# Patient Record
Sex: MALE | Race: WHITE | Employment: FULL TIME | URBAN - METROPOLITAN AREA
[De-identification: names, ages, dates, MRNs, and addresses within clinical notes are randomized per-mention and may not be internally consistent; named-entity substitution may affect disease eponyms.]

---

## 2017-01-01 ENCOUNTER — OFFICE VISIT (OUTPATIENT)
Dept: URGENT CARE | Facility: URGENT CARE | Age: 27
End: 2017-01-01
Payer: COMMERCIAL

## 2017-01-01 VITALS
HEART RATE: 81 BPM | WEIGHT: 161 LBS | SYSTOLIC BLOOD PRESSURE: 127 MMHG | TEMPERATURE: 97.8 F | BODY MASS INDEX: 27.86 KG/M2 | OXYGEN SATURATION: 97 % | DIASTOLIC BLOOD PRESSURE: 74 MMHG

## 2017-01-01 DIAGNOSIS — S61.452A CAT BITE OF HAND, LEFT, INITIAL ENCOUNTER: Primary | ICD-10-CM
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
## 2017-01-01 DIAGNOSIS — L03.114 CELLULITIS OF LEFT UPPER EXTREMITY: ICD-10-CM

## 2017-01-01 PROCEDURE — 99213 OFFICE O/P EST LOW 20 MIN: CPT | Performed by: PHYSICIAN ASSISTANT

## 2017-01-01 NOTE — MR AVS SNAPSHOT
"              After Visit Summary   2017    Matt Quintanilla    MRN: [id number]           Patient Information     Date Of Birth          1990        Visit Information        Provider Department      2017 11:25 AM Anastasia Lassiter PA-C [facility]        Today's Diagnoses     Cat bite of hand, left, initial encounter    -  1     Cellulitis of left upper extremity            Follow-ups after your visit        Who to contact     If you have questions or need follow up information about today's clinic visit or your schedule please contact [facility] directly at [phone].  Normal or non-critical lab and imaging results will be communicated to you by [email or web address], letter or phone within 4 business days after the clinic has received the results. If you do not hear from us within 7 days, please contact the clinic through [email or web address] or phone. If you have a critical or abnormal lab result, we will notify you by phone as soon as possible.  Submit refill requests through Rico or call your pharmacy and they will forward the refill request to us. Please allow 3 business days for your refill to be completed.          Additional Information About Your Visit        MyChart Information     Rico lets you send messages to your doctor, view your test results, renew your prescriptions, schedule appointments and more. To sign up, go to [email or web address] . Click on \"Log in\" on the left side of the screen, which will take you to the Welcome page. Then click on \"Sign up Now\" on the right side of the page.     You will be asked to enter the access code listed below, as well as some personal information. Please follow the directions to create your username and password.     Your access code is: [email or web address]  Expires: 2017 12:03 PM     Your access code will  in 90 days. If you need help or a new code, please call your [facility] or [phone].        Your Vitals Were "     Pulse Temperature Pulse Oximetry             81 97.8  F (36.6  C) (Oral) 97%          Blood Pressure from Last 3 Encounters:   01/01/17 127/74   09/09/16 136/80   07/13/15 137/80    Weight from Last 3 Encounters:   01/01/17 161 lb (73.029 kg)   09/09/16 153 lb (69.4 kg)   07/13/15 175 lb (79.379 kg)              Today, you had the following     No orders found for display         Today's Medication Changes          These changes are accurate as of: 1/1/17 12:03 PM.  If you have any questions, ask your nurse or doctor.               Start taking these medicines.        Dose/Directions    amoxicillin-clavulanate 875-125 MG per tablet   Commonly known as:  AUGMENTIN   Used for:  Cat bite of hand, left, initial encounter, Cellulitis of left upper extremity   Started by:  Anastasia Lassiter PA-C        Dose:  1 tablet   Take 1 tablet by mouth 2 times daily   Quantity:  20 tablet   Refills:  0            Where to get your medicines      These medications were sent to [facility]/pharmacy #[id number] - [place] [facility] [place], [place] - [address]  [address] [address]     Phone:  [phone]    - amoxicillin-clavulanate 875-125 MG per tablet             Primary Care Provider Office Phone # Fax #    Argelia Naranjo [phone]       [facility] [address]  [facility] [address]        Thank you!     Thank you for choosing [facility]  for your care. Our goal is always to provide you with excellent care. Hearing back from our patients is one way we can continue to improve our services. Please take a few minutes to complete the written survey that you may receive in the mail after your visit with us. Thank you!             Your Updated Medication List - Protect others around you: Learn how to safely use, store and throw away your medicines at www.disposemymeds.org.          This list is accurate as of: 1/1/17 12:03 PM.  Always use your most recent med list.                    Brand Name Dispense Instructions for use    amoxicillin-clavulanate 875-125 MG per tablet    AUGMENTIN    20 tablet    Take 1 tablet by mouth 2 times daily       omeprazole 40 MG capsule    priLOSEC    30 capsule    Take 1 capsule (40 mg) by mouth daily Take 30-60 minutes before a meal.

## 2017-01-01 NOTE — NURSING NOTE
"Chief Complaint   Patient presents with     Trauma     Pt c/o cat bite on left hand with swelling, redness, and warmth.        Initial /74 mmHg  Pulse 81  Temp(Src) 97.8  F (36.6  C) (Oral)  Wt 161 lb (73.029 kg)  SpO2 97% Estimated body mass index is 27.86 kg/(m^2) as calculated from the following:    Height as of 9/9/16: 5' 3.75\" (1.619 m).    Weight as of this encounter: 161 lb (73.029 kg).  BP completed using cuff size: regular    Kristin Gill CMA (AAMA)      "

## 2017-01-01 NOTE — PROGRESS NOTES
SUBJECTIVE:                                                    Matt Quintanilla is a 26 year old male who presents to clinic today for the following health issues:    Cat Bite      Duration: yesterday evening    Description (location/character/radiation): left hand    Intensity:  moderate    Accompanying signs and symptoms: swelling, redness, warm to the touch    History (similar episodes/previous evaluation): None    Precipitating or alleviating factors: Pt states that the cat is up to date on their vaccines.     Therapies tried and outcome: None   Friend's cat bit him last night. Cat is current on rabies vaccinations. Tdap 2011 per [facility]        Allergies   Allergen Reactions     No Known Drug Allergies        No past medical history on file.      Current Outpatient Prescriptions on File Prior to Visit:  omeprazole (PRILOSEC) 40 MG capsule Take 1 capsule (40 mg) by mouth daily Take 30-60 minutes before a meal.     No current facility-administered medications on file prior to visit.    Social History   Substance Use Topics     Smoking status: Current Every Day Smoker -- 0.50 packs/day for 1 years     Types: Cigarettes     Smokeless tobacco: Never Used     Alcohol Use: Yes      Comment: socially       ROS:  General: negative for fever  SKIN: + as above      Physcial Exam:  /74 mmHg  Pulse 81  Temp(Src) 97.8  F (36.6  C) (Oral)  Wt 161 lb (73.029 kg)  SpO2 97%    GENERAL: alert, no acute distress  EYES: conjunctival clear  RESP: Regular breathing rate  NEURO: awake .  SKIN: Left dorsal hand red, tender, mild swelling. 3 puncture wounds base left middle finger. Circ./sensation intact. FROM of wrist/fingers    ASSESSMENT:    ICD-10-CM    1. Cat bite of hand, left, initial encounter S61.452A amoxicillin-clavulanate (AUGMENTIN) 875-125 MG per tablet    W55.01XA    2. Cellulitis of left upper extremity L03.114 amoxicillin-clavulanate (AUGMENTIN) 875-125 MG per tablet       PLAN:Warm soaks in [place] daily. Recheck  3 days if not better, sooner if worsens.  See today's orders.  Follow-up with primary clinic if not improving.  Advised about symptoms which might herald more serious problems.    Anastasia Lassiter PA-C

## 2017-01-11 ENCOUNTER — OFFICE VISIT (OUTPATIENT)
Dept: FAMILY MEDICINE | Facility: CLINIC | Age: 27
End: 2017-01-11
Payer: COMMERCIAL

## 2017-01-11 VITALS
BODY MASS INDEX: 27.45 KG/M2 | DIASTOLIC BLOOD PRESSURE: 74 MMHG | OXYGEN SATURATION: 95 % | TEMPERATURE: 98.3 F | SYSTOLIC BLOOD PRESSURE: 130 MMHG | HEART RATE: 79 BPM | HEIGHT: 64 IN | WEIGHT: 160.8 LBS

## 2017-01-11 DIAGNOSIS — A63.0 CONDYLOMA ACUMINATA: Primary | ICD-10-CM

## 2017-01-11 PROCEDURE — 54056 CRYOSURGERY PENIS LESION(S): CPT | Performed by: FAMILY MEDICINE

## 2017-01-11 PROCEDURE — 99207 ZZC NO CHARGE LOS: CPT | Performed by: FAMILY MEDICINE

## 2017-01-11 RX ORDER — IMIQUIMOD 12.5 MG/.25G
CREAM TOPICAL
Qty: 12 PACKET | Refills: 3 | Status: SHIPPED | OUTPATIENT
Start: 2017-01-11 | End: 2021-08-10

## 2017-01-11 NOTE — NURSING NOTE
"Chief Complaint   Patient presents with     Wart     groin and penile warts       Initial /74 mmHg  Pulse 79  Temp(Src) 98.3  F (36.8  C) (Oral)  Ht 5' 4\" (1.626 m)  Wt 160 lb 12.8 oz (72.938 kg)  BMI 27.59 kg/m2  SpO2 95% Estimated body mass index is 27.59 kg/(m^2) as calculated from the following:    Height as of this encounter: 5' 4\" (1.626 m).    Weight as of this encounter: 160 lb 12.8 oz (72.938 kg).  BP completed using cuff size: dale Venegas CMA  January 11, 2017 10:48 AM        "

## 2017-01-11 NOTE — PROGRESS NOTES
"  SUBJECTIVE:                                                    Matt Quintanilla is a 26 year old male who presents to clinic today for the following health issues:      WART(S)      Onset: a few years ago    Description (location/number): groin and penis---about 5    Accompanying signs and symptoms: Painful: no     History: prior warts: no     Therapies tried and outcome: Compound W       Problem list and histories reviewed & adjusted, as indicated.  Additional history: as documented    Problem list, Medication list, Allergies, and Medical/Social/Surgical histories reviewed in [facility] and updated as appropriate.    ROS:  Constitutional, HEENT, cardiovascular, pulmonary, GI, , musculoskeletal, neuro, skin, endocrine and psych systems are negative, except as otherwise noted.    OBJECTIVE:                                                    /74 mmHg  Pulse 79  Temp(Src) 98.3  F (36.8  C) (Oral)  Ht 5' 4\" (1.626 m)  Wt 160 lb 12.8 oz (72.938 kg)  BMI 27.59 kg/m2  SpO2 95%  Body mass index is 27.59 kg/(m^2).  GENERAL: healthy, alert and no distress  NECK: no adenopathy, no asymmetry, masses, or scars and thyroid normal to palpation  RESP: lungs clear to auscultation - no rales, rhonchi or wheezes  CV: regular rate and rhythm, normal S1 S2, no S3 or S4, no murmur, click or rub, no peripheral edema and peripheral pulses strong  ABDOMEN: soft, nontender, no hepatosplenomegaly, no masses and bowel sounds normal  MS: no gross musculoskeletal defects noted, no edema  : about 7 warty lesions measuring 0.5 to 1 cm at base and shaft of penis  Diagnostic Test Results:  none      ASSESSMENT/PLAN:                                                      1. Condyloma acuminata  Liquid nitrogen used today to treat 7 warts with 3 freeze and thaw cycles. Patient given Aldara to try for 16 weeks. Patient will see dermatology if no improvements.  - imiquimod (ALDARA) 5 % cream; Apply a small sized amount to warts or molluscum " three times weekly at bedtime.   Wash off after 8 hours.   May use for up to 16 weeks.  Dispense: 12 packet; Refill: 3  - DERMATOLOGY REFERRAL  - DESTRUCT BENIGN LESION, UP TO 14    See Patient Instructions    Rubén Almanzar MD, MD  [facility]

## 2017-01-11 NOTE — MR AVS SNAPSHOT
After Visit Summary   1/11/2017    Matt Quintanilla    MRN: [id number]           Patient Information     Date Of Birth          1990        Visit Information        Provider Department      1/11/2017 10:40 AM Rubén Almanzar MD [facility]        Today's Diagnoses     Condyloma acuminata    -  1        Follow-ups after your visit        Additional Services     DERMATOLOGY REFERRAL       Your provider has referred you to: N: [facility] with Dr. Owen Mauro [phone]   [email or web address]    Please be aware that coverage of these services is subject to the terms and limitations of your health insurance plan.  Call member services at your health plan with any benefit or coverage questions.      Please bring the following with you to your appointment:    (1) Any X-Rays, CTs or MRIs which have been performed.  Contact the facility where they were done to arrange for  prior to your scheduled appointment.    (2) List of current medications  (3) This referral request   (4) Any documents/labs given to you for this referral                  Who to contact     If you have questions or need follow up information about today's clinic visit or your schedule please contact [facility] directly at [phone].  Normal or non-critical lab and imaging results will be communicated to you by MyChart, letter or phone within 4 business days after the clinic has received the results. If you do not hear from us within 7 days, please contact the clinic through MyChart or phone. If you have a critical or abnormal lab result, we will notify you by phone as soon as possible.  Submit refill requests through Yodo1 or call your pharmacy and they will forward the refill request to us. Please allow 3 business days for your refill to be completed.          Additional Information About Your Visit        Yopolishart Information     Yodo1 lets you send  "messages to your doctor, view your test results, renew your prescriptions, schedule appointments and more. To sign up, go to [email or web address] . Click on \"Log in\" on the left side of the screen, which will take you to the Welcome page. Then click on \"Sign up Now\" on the right side of the page.     You will be asked to enter the access code listed below, as well as some personal information. Please follow the directions to create your username and password.     Your access code is: [email or web address]  Expires: 2017 12:03 PM     Your access code will  in 90 days. If you need help or a new code, please call your [facility] clinic or [phone].        Care EveryWhere ID     This is your Care EveryWhere ID. This could be used by other organizations to access your [facility] medical records  [id number]        Your Vitals Were     Pulse Temperature Height BMI (Body Mass Index) Pulse Oximetry       79 98.3  F (36.8  C) (Oral) 5' 4\" (1.626 m) 27.59 kg/m2 95%        Blood Pressure from Last 3 Encounters:   17 130/74   17 127/74   16 136/80    Weight from Last 3 Encounters:   17 160 lb 12.8 oz (72.938 kg)   17 161 lb (73.029 kg)   16 153 lb (69.4 kg)              We Performed the Following     DERMATOLOGY REFERRAL          Today's Medication Changes          These changes are accurate as of: 17 11:00 AM.  If you have any questions, ask your nurse or doctor.               Start taking these medicines.        Dose/Directions    imiquimod 5 % cream   Commonly known as:  ALDARA   Used for:  Condyloma acuminata   Started by:  Rubén Almanzar MD        Apply a small sized amount to warts or molluscum three times weekly at bedtime.   Wash off after 8 hours.   May use for up to 16 weeks.   Quantity:  12 packet   Refills:  3            Where to get your medicines      These medications were sent to [facility]/pharmacy #[id number] - [place] - [address]  [address] [facility] [address]     Phone:  [phone]    - imiquimod 5 % cream             Primary Care Provider Office Phone # Fax #    Argelia Cindy Naranjo [phone]       [facility] [address]  [facility] [address]        Thank you!     Thank you for choosing [facility]  for your care. Our goal is always to provide you with excellent care. Hearing back from our patients is one way we can continue to improve our services. Please take a few minutes to complete the written survey that you may receive in the mail after your visit with us. Thank you!             Your Updated Medication List - Protect others around you: Learn how to safely use, store and throw away your medicines at www.disposemymeds.org.          This list is accurate as of: 1/11/17 11:00 AM.  Always use your most recent med list.                   Brand Name Dispense Instructions for use    amoxicillin-clavulanate 875-125 MG per tablet    AUGMENTIN    20 tablet    Take 1 tablet by mouth 2 times daily       imiquimod 5 % cream    ALDARA    12 packet    Apply a small sized amount to warts or molluscum three times weekly at bedtime.   Wash off after 8 hours.   May use for up to 16 weeks.       omeprazole 40 MG capsule    priLOSEC    30 capsule    Take 1 capsule (40 mg) by mouth daily Take 30-60 minutes before a meal.

## 2017-04-17 ENCOUNTER — TELEPHONE (OUTPATIENT)
Dept: URGENT CARE | Facility: URGENT CARE | Age: 27
End: 2017-04-17

## 2017-04-17 ENCOUNTER — RADIANT APPOINTMENT (OUTPATIENT)
Dept: GENERAL RADIOLOGY | Facility: CLINIC | Age: 27
End: 2017-04-17
Attending: PHYSICIAN ASSISTANT
Payer: COMMERCIAL

## 2017-04-17 ENCOUNTER — OFFICE VISIT (OUTPATIENT)
Dept: URGENT CARE | Facility: URGENT CARE | Age: 27
End: 2017-04-17
Payer: COMMERCIAL

## 2017-04-17 VITALS
HEART RATE: 74 BPM | TEMPERATURE: 97.3 F | WEIGHT: 170.2 LBS | DIASTOLIC BLOOD PRESSURE: 80 MMHG | OXYGEN SATURATION: 96 % | SYSTOLIC BLOOD PRESSURE: 135 MMHG | BODY MASS INDEX: 29.21 KG/M2

## 2017-04-17 DIAGNOSIS — M77.8 TENDINITIS OF RIGHT WRIST: ICD-10-CM

## 2017-04-17 DIAGNOSIS — M25.531 RIGHT WRIST PAIN: ICD-10-CM

## 2017-04-17 DIAGNOSIS — M25.531 RIGHT WRIST PAIN: Primary | ICD-10-CM

## 2017-04-17 PROCEDURE — 99213 OFFICE O/P EST LOW 20 MIN: CPT | Performed by: PHYSICIAN ASSISTANT

## 2017-04-17 PROCEDURE — 73110 X-RAY EXAM OF WRIST: CPT | Mod: RT

## 2017-04-17 RX ORDER — NAPROXEN 500 MG/1
500 TABLET ORAL 2 TIMES DAILY PRN
Qty: 30 TABLET | Refills: 1 | Status: SHIPPED | OUTPATIENT
Start: 2017-04-17 | End: 2021-08-10

## 2017-04-17 NOTE — NURSING NOTE
"Chief Complaint   Patient presents with     Musculoskeletal Problem     started on Friday on the right wrist       Initial /80  Pulse 74  Temp 97.3  F (36.3  C) (Oral)  Wt 170 lb 3.2 oz (77.2 kg)  SpO2 96%  BMI 29.21 kg/m2 Estimated body mass index is 29.21 kg/(m^2) as calculated from the following:    Height as of 1/11/17: 5' 4\" (1.626 m).    Weight as of this encounter: 170 lb 3.2 oz (77.2 kg).  Medication Reconciliation: complete   Bill Molina MA        "

## 2017-04-17 NOTE — PROGRESS NOTES
SUBJECTIVE:                                                    Matt Quintanilla is a 26 year old male who presents to clinic today for the following health issues:      Musculoskeletal problem/pain      Duration: Friday    Description  Location: Right wrist    Intensity:  8/10    Accompanying signs and symptoms: none    History  Previous similar problem: no   Previous evaluation:  none    Precipitating or alleviating factors:  Trauma or overuse: no   Aggravating factors include: using the hand    Therapies tried and outcome: support wrap        No injury. Works at [employer] in [employer]. No numbness/tingling.      Allergies   Allergen Reactions     No Known Drug Allergies        No past medical history on file.      Current Outpatient Prescriptions on File Prior to Visit:  imiquimod (ALDARA) 5 % cream Apply a small sized amount to warts or molluscum three times weekly at bedtime.   Wash off after 8 hours.   May use for up to 16 weeks.   omeprazole (PRILOSEC) 40 MG capsule Take 1 capsule (40 mg) by mouth daily Take 30-60 minutes before a meal. (Patient not taking: Reported on 4/17/2017)     No current facility-administered medications on file prior to visit.     Social History   Substance Use Topics     Smoking status: Current Some Day Smoker     Packs/day: 0.50     Years: 1.00     Types: Cigars     Smokeless tobacco: Never Used     Alcohol use Yes      Comment: socially       ROS:  Gen: np fevers  Musculoskel: + as above  Skin: as above    OBJECTIVE:  /80  Pulse 74  Temp 97.3  F (36.3  C) (Oral)  Wt 170 lb 3.2 oz (77.2 kg)  SpO2 96%  BMI 29.21 kg/m2   General:   awake, alert, and cooperative.  NAD.   Head: Normocephalic, atraumatic.  Eyes: Conjunctiva clear,   MS:  R wrist, no swelling.  Tender mid dorsal wrist. Pain with resisted flexion/extension. No snuff box tenderness. FROM. Circ./sensation intact. Good radial pulse.  Neuro: Alert and oriented - normal speech.    ASSESSMENT:    ICD-10-CM    1. Right  wrist pain M25.531 naproxen (NAPROSYN) 500 MG tablet     ORTHOPEDICS ADULT REFERRAL     CANCELED: XR Wrist Right G/E 3 Views   2. Tendinitis of right wrist M77.8            PLAN: Ice, wrist splint.   Advised about symptoms which might herald more serious problems.      Anastasia Lassiter PA-C

## 2017-04-17 NOTE — TELEPHONE ENCOUNTER
Bill Molina contacted Matt on 04/17/17 and left a message. If patient calls back please relay message (normal labs) below. Bill Molina MA    The Radiologist felt your x rays were normal.     Please notify patient of results.     Anastasia Lassiter PA-C

## 2017-04-17 NOTE — MR AVS SNAPSHOT
After Visit Summary   4/17/2017    Matt Quintanilla    MRN: [id number]           Patient Information     Date Of Birth          1990        Visit Information        Provider Department      4/17/2017 12:10 PM Anastasia Lassiter PA-C [facility]        Today's Diagnoses     Right wrist pain    -  1    Tendinitis of right wrist           Follow-ups after your visit        Additional Services     ORTHOPEDICS ADULT REFERRAL       Your provider has referred you to: [facility]: [facility] [phone]   [email or web address]    Please be aware that coverage of these services is subject to the terms and limitations of your health insurance plan.  Call member services at your health plan with any benefit or coverage questions.      Please bring the following to your appointment:    >>   Any x-rays, CTs or MRIs which have been performed.  Contact the facility where they were done to arrange for  prior to your scheduled appointment.  Any new CT, MRI or other procedures ordered by your specialist must be performed at a [facility] facility or coordinated by your clinic's referral office.    >>   List of current medications   >>   This referral request   >>   Any documents/labs given to you for this referral                  Who to contact     If you have questions or need follow up information about today's clinic visit or your schedule please contact [facility] directly at [phone].  Normal or non-critical lab and imaging results will be communicated to you by MyChart, letter or phone within 4 business days after the clinic has received the results. If you do not hear from us within 7 days, please contact the clinic through MyChart or phone. If you have a critical or abnormal lab result, we will notify you by phone as soon as possible.  Submit refill requests through BringMeTheNewst or call your pharmacy and they  "will forward the refill request to us. Please allow 3 business days for your refill to be completed.          Additional Information About Your Visit        InHomeVestharFinancial Fairy Tales Information     H-care lets you send messages to your doctor, view your test results, renew your prescriptions, schedule appointments and more. To sign up, go to [email or web address] . Click on \"Log in\" on the left side of the screen, which will take you to the Welcome page. Then click on \"Sign up Now\" on the right side of the page.     You will be asked to enter the access code listed below, as well as some personal information. Please follow the directions to create your username and password.     Your access code is: [email or web address]  Expires: 2017  3:03 PM     Your access code will  in 90 days. If you need help or a new code, please call your [facility] clinic or [phone].        Care EveryWhere ID     This is your Care EveryWhere ID. This could be used by other organizations to access your [facility] medical records  [id number]        Your Vitals Were     Pulse Temperature Pulse Oximetry BMI (Body Mass Index)          74 97.3  F (36.3  C) (Oral) 96% 29.21 kg/m2         Blood Pressure from Last 3 Encounters:   17 135/80   17 130/74   17 127/74    Weight from Last 3 Encounters:   17 170 lb 3.2 oz (77.2 kg)   17 160 lb 12.8 oz (72.9 kg)   17 161 lb (73 kg)              We Performed the Following     ORTHOPEDICS ADULT REFERRAL          Today's Medication Changes          These changes are accurate as of: 17  3:03 PM.  If you have any questions, ask your nurse or doctor.               Start taking these medicines.        Dose/Directions    naproxen 500 MG tablet   Commonly known as:  NAPROSYN   Used for:  Right wrist pain   Started by:  Anastasia Lassiter PA-C        Dose:  500 mg   Take 1 tablet (500 mg) by mouth 2 times daily as needed for moderate pain   Quantity:  30 tablet   Refills:  1          "   Where to get your medicines      These medications were sent to [facility]/pharmacy #[id number] - [facility] [facility] [place], [place] - [address]  [address] [facility] [address]     Phone:  [phone]     naproxen 500 MG tablet                Primary Care Provider Office Phone # Fax #    Argelia Cindy Naranjo [phone]       [facility] [address]  [facility] [address]        Thank you!     Thank you for choosing [facility]  for your care. Our goal is always to provide you with excellent care. Hearing back from our patients is one way we can continue to improve our services. Please take a few minutes to complete the written survey that you may receive in the mail after your visit with us. Thank you!             Your Updated Medication List - Protect others around you: Learn how to safely use, store and throw away your medicines at www.disposemymeds.org.          This list is accurate as of: 4/17/17  3:03 PM.  Always use your most recent med list.                   Brand Name Dispense Instructions for use    imiquimod 5 % cream    ALDARA    12 packet    Apply a small sized amount to warts or molluscum three times weekly at bedtime.   Wash off after 8 hours.   May use for up to 16 weeks.       naproxen 500 MG tablet    NAPROSYN    30 tablet    Take 1 tablet (500 mg) by mouth 2 times daily as needed for moderate pain       omeprazole 40 MG capsule    priLOSEC    30 capsule    Take 1 capsule (40 mg) by mouth daily Take 30-60 minutes before a meal.

## 2017-04-17 NOTE — LETTER
[facility]  [address]  [facility] [address]  Phone: [phone]    April 17, 2017        Matt Quintanilla  [address] [facility] [address]          To whom it may concern:    RE: Matt Quintanilla    Patient was seen and treated today at our clinic for right wrist pain. Please excuse from work 4/17/2017    Please contact me for questions or concerns.      Sincerely,        Anastasia Lassiter PA-C

## 2017-04-19 ENCOUNTER — TELEPHONE (OUTPATIENT)
Dept: FAMILY MEDICINE | Facility: CLINIC | Age: 27
End: 2017-04-19

## 2017-04-19 NOTE — TELEPHONE ENCOUNTER
What type of form? ADA DISABILITY FORM   What day did you drop off your forms? Wednesday April 19th 2017  Is there a due date? ASAP  (7-10 business day to compete forms)   How would you like to receive these forms? Pt will  form   Provider pt. See's ? Dr. Argelia Naranjo   What is the best number to contact you? Pt . Can be reached at ( [phone]  What time works best to contact you with in 4 hrs?  Anytime   Is it okay to leave a message? Yes     FORM IN TEAM HEART BOX    Antwon Guadarrama, Patient Rep  [facility]

## 2017-04-20 NOTE — TELEPHONE ENCOUNTER
Form is received from the  and forward to Dr. Naranjo to address.  Audie Plascencia,  For [employer] Comfort and Heart

## 2017-04-24 NOTE — TELEPHONE ENCOUNTER
I don't have any information in the chart that indicates a disability. Patient needs to make an appointment. This form does not apply to temporary problems like tendonitis unless the issue is persistent over time. This needs further assessment.  Argelia Naranjo MD

## 2017-04-24 NOTE — TELEPHONE ENCOUNTER
Pt's incomplete form will be deliver to the  this afternoon for pickup after 1p.  Audie Plascencia,  [employer]    Called and left msg for pt the form is not completed, pt needs to schedule a follow up appt with Dr. Naranjo or a provider and bring the form to the appt to have provider fill out for pt, per Dr. Naranjo pt needs further assessment. Pt's form will be at the  for pt to pickup today after 1p.  Audie Plascencia,  For [employer]

## 2017-04-25 ENCOUNTER — OFFICE VISIT (OUTPATIENT)
Dept: ORTHOPEDICS | Facility: CLINIC | Age: 27
End: 2017-04-25
Payer: COMMERCIAL

## 2017-04-25 VITALS — RESPIRATION RATE: 15 BRPM | HEIGHT: 65 IN | BODY MASS INDEX: 29.32 KG/M2 | WEIGHT: 176 LBS

## 2017-04-25 DIAGNOSIS — M25.531 RIGHT WRIST PAIN: Primary | ICD-10-CM

## 2017-04-25 PROCEDURE — 99203 OFFICE O/P NEW LOW 30 MIN: CPT | Performed by: ORTHOPAEDIC SURGERY

## 2017-04-25 NOTE — PROGRESS NOTES
Matt Quintanilla is a 26 year old male who is seen in consultation at the request of Anastasia Lassiter PA-C  for right wrist pain.    History reviewed. No pertinent past medical history.    History reviewed. No pertinent surgical history.    Family History   Problem Relation Age of Onset     Hypertension Mother      Lipids Mother      CEREBROVASCULAR DISEASE Mother      DIABETES Father      DIABETES Maternal Grandfather      DIABETES Paternal Grandmother      DIABETES Paternal Grandfather        Social History     Social History     Marital status: Single     Spouse name: N/A     Number of children: N/A     Years of education: N/A     Occupational History     Not on file.     Social History Main Topics     Smoking status: Current Some Day Smoker     Packs/day: 0.50     Years: 1.00     Types: Cigars     Smokeless tobacco: Never Used     Alcohol use Yes      Comment: socially     Drug use: No     Sexual activity: Not Currently     Partners: Female     Other Topics Concern     Parent/Sibling W/ Cabg, Mi Or Angioplasty Before 65f 55m? No     Social History Narrative       Current Outpatient Prescriptions   Medication Sig Dispense Refill     naproxen (NAPROSYN) 500 MG tablet Take 1 tablet (500 mg) by mouth 2 times daily as needed for moderate pain 30 tablet 1     imiquimod (ALDARA) 5 % cream Apply a small sized amount to warts or molluscum three times weekly at bedtime.   Wash off after 8 hours.   May use for up to 16 weeks. 12 packet 3     omeprazole (PRILOSEC) 40 MG capsule Take 1 capsule (40 mg) by mouth daily Take 30-60 minutes before a meal. (Patient not taking: Reported on 4/17/2017) 30 capsule 1       Allergies   Allergen Reactions     No Known Drug Allergies        REVIEW OF SYSTEMS:  CONSTITUTIONAL:  NEGATIVE for fever, chills, change in weight, not feeling tired  SKIN:  NEGATIVE for worrisome rashes, no skin lumps, no skin ulcers and no non-healing wounds  EYES:  NEGATIVE for vision changes or  "irritation.  ENT/MOUTH:  NEGATIVE.  No hearing loss, no hoarseness, no difficulty swallowing.  RESP:  NEGATIVE. No cough or shortness of breath.  BREAST:  NEGATIVE for masses, tenderness or discharge  CV:  NEGATIVE for chest pain, palpitations or peripheral edema  GI:  NEGATIVE for nausea, abdominal pain, heartburn, or change in bowel habits  :  Negative. No dysuria, no hematuria  MUSCULOSKELETAL:  See HPI above  NEURO:  NEGATIVE . No headaches, no dizziness,  no numbness  ENDOCRINE:  NEGATIVE for temperature intolerance, skin/hair changes  HEME/ALLERGY/IMMUNE:  NEGATIVE for bleeding problems  PSYCHIATRIC:  NEGATIVE. no anxiety, no depression.     Exam:  Vitals: Resp 15  Ht 1.638 m (5' 4.5\")  Wt 79.8 kg (176 lb)  BMI 29.74 kg/m2  BMI= Body mass index is 29.74 kg/(m^2).  Constitutional:  healthy, alert and no distress  Neuro: Alert and Oriented x 3, Gait normal. Sensation grossly WNL.  Psych: Affect normal   Respiratory: Breathing not labored.  Cardiovascular: normal peripheral pulses  Lymph: no adenopathy  Skin: No rashes,worrisome lesions or skin problems    "

## 2017-04-25 NOTE — NURSING NOTE
"Chief Complaint   Patient presents with     Consult     Referred by Anastasia Lassiter PA-C for right wrist pain, denies injury or trauma. Xrays taken 4/17/17.       Initial Resp 15  Ht 1.638 m (5' 4.5\")  Wt 79.8 kg (176 lb)  BMI 29.74 kg/m2 Estimated body mass index is 29.74 kg/(m^2) as calculated from the following:    Height as of this encounter: 1.638 m (5' 4.5\").    Weight as of this encounter: 79.8 kg (176 lb).  Medication Reconciliation: complete     Jay Moss PA-C  Supervising physician: Redd Aceves MD  Dept. of Orthopedics  [facility]          "

## 2017-04-25 NOTE — PROGRESS NOTES
DATE OF VISIT:  04/25/2017.      HISTORY OF PRESENT ILLNESS:  Mr. Bill Quintanilla is a 26-year-old right-hand dominant  seen with right wrist pain.  It has been present since 04/17/2017.  He does not recall a specific injury.  He wakes up in the morning with pain, gets a little better after he uses it, but has persistent pain throughout the days.  He seems to hurt with movement.  He has used a wrist brace with some mild relief.  He has dull pain rated 7/10 in the right wrist.  He has used naproxen 500 mg b.i.d.  He used it twice a day for the first 3-4 days, but then cut back on it.  He has had previous problems with the left wrist with x-rays performed in 2009.  Those are not available for us at this time.  X-ray of the right wrist has been performed 04/17/2017, showing an ulnar minus wrist with no definite evidence of lunate sclerosis or other pathology.      PHYSICAL EXAMINATION:  Diffuse pain in the right wrist.  He has pain with flexion, extension, mainly with extension dorsally.  He does have some pain with radial and ulnar deviation as well. Has no significant pain with pronation, supination.  He has no tenderness in the snuffbox but does have some tenderness in the mid dorsal aspect of the wrist.  He has good  strength and thumb opposition strength.  Has negative Tinel over the ulnar nerve at the wrist and elbow.  Has mildly positive Tinel over the median nerve at the right wrist but has negative Phalen's test.  Sensation and circulation are intact.      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  We discussed options of continuing the wrist brace and observation.  Would also consider referral to Hand Surgery to see if anything would be recommended for the ulnar minus variance.  He wishes to talk to Hand Surgery so we will refer to Dr. Johana Harrison for this.         LESLIE BUCKNER MD             D: 04/25/2017 12:47   T: 04/25/2017 17:07   MT: SAVI      Name:     BILL QUINTANILLA    MRN:      [id number]        Account:      [id number]   :      1990           Visit Date:   2017      Document: [id number]

## 2017-04-25 NOTE — MR AVS SNAPSHOT
After Visit Summary   4/25/2017    Matt Quintanilla    MRN: [id number]           Patient Information     Date Of Birth          1990        Visit Information        Provider Department      4/25/2017 11:00 AM Redd Aceves MD [facility]        Today's Diagnoses     Right wrist pain    -  1       Follow-ups after your visit        Additional Services     ORTHO  REFERRAL       [facility] is referring you to the Orthopedic  Services at [facility].       The  Representative will assist you in the coordination of your Orthopedic and Musculoskeletal Care as prescribed by your physician.    The  Representative will call you within 1 business day to help schedule your appointment, or you may contact the  Representative at:    All areas ~ [phone]     Type of Referral : Surgical / Specialist hand specialist Johana Harrison MD possible ulnar minus variance      Timeframe requested: 1 - 2 days    Coverage of these services is subject to the terms and limitations of your health insurance plan.  Please call member services at your health plan with any benefit or coverage questions.      If X-rays, CT or MRI's have been performed, please contact the facility where they were done to arrange for , prior to your scheduled appointment.  Please bring this referral request to your appointment and present it to your specialist.                  Who to contact     If you have questions or need follow up information about today's clinic visit or your schedule please contact [facility] directly at [phone].  Normal or non-critical lab and imaging results will be communicated to you by MyChart, letter or phone within 4 business days after the clinic has received the results. If you do not hear from us within 7 days, please contact the clinic through MyChart or phone. If you have a  "critical or abnormal lab result, we will notify you by phone as soon as possible.  Submit refill requests through [employer] or call your pharmacy and they will forward the refill request to us. Please allow 3 business days for your refill to be completed.          Additional Information About Your Visit        MyChart Information     [employer] lets you send messages to your doctor, view your test results, renew your prescriptions, schedule appointments and more. To sign up, go to [email or web address] . Click on \"Log in\" on the left side of the screen, which will take you to the Welcome page. Then click on \"Sign up Now\" on the right side of the page.     You will be asked to enter the access code listed below, as well as some personal information. Please follow the directions to create your username and password.     Your access code is: [email or web address]  Expires: 2017  3:03 PM     Your access code will  in 90 days. If you need help or a new code, please call your [place] clinic or [phone].        Care EveryWhere ID     This is your [facility] EveryWhere ID. This could be used by other organizations to access your [facility] medical records  [id number]        Your Vitals Were     Respirations Height BMI (Body Mass Index)             15 1.638 m (5' 4.5\") 29.74 kg/m2          Blood Pressure from Last 3 Encounters:   17 135/80   17 130/74   17 127/74    Weight from Last 3 Encounters:   17 79.8 kg (176 lb)   17 77.2 kg (170 lb 3.2 oz)   17 72.9 kg (160 lb 12.8 oz)              We Performed the Following     ORTHO  REFERRAL        Primary Care Provider Office Phone # Fax #    Argelia Naranjo [phone]       [facility] [address]  [facility] [address]        Thank you!     Thank you for choosing [facility]  for your care. Our goal is always to provide you with excellent care. Hearing back from our patients is " one way we can continue to improve our services. Please take a few minutes to complete the written survey that you may receive in the mail after your visit with us. Thank you!             Your Updated Medication List - Protect others around you: Learn how to safely use, store and throw away your medicines at www.disposemymeds.org.          This list is accurate as of: 4/25/17 11:42 AM.  Always use your most recent med list.                   Brand Name Dispense Instructions for use    imiquimod 5 % cream    ALDARA    12 packet    Apply a small sized amount to warts or molluscum three times weekly at bedtime.   Wash off after 8 hours.   May use for up to 16 weeks.       naproxen 500 MG tablet    NAPROSYN    30 tablet    Take 1 tablet (500 mg) by mouth 2 times daily as needed for moderate pain       omeprazole 40 MG capsule    priLOSEC    30 capsule    Take 1 capsule (40 mg) by mouth daily Take 30-60 minutes before a meal.

## 2017-04-25 NOTE — LETTER
4/25/2017       RE: Matt Quintanilla  [address]  [facility] [address]           Dear Colleague,    Thank you for referring your patient, Matt Quintanilla, to the [facility]. Please see a copy of my visit note below.    Matt Quintanilla is a 26 year old male who is seen in consultation at the request of Anastasia Lassiter PA-C  for right wrist pain.    History reviewed. No pertinent past medical history.    History reviewed. No pertinent surgical history.    Family History   Problem Relation Age of Onset     Hypertension Mother      Lipids Mother      CEREBROVASCULAR DISEASE Mother      DIABETES Father      DIABETES Maternal Grandfather      DIABETES Paternal Grandmother      DIABETES Paternal Grandfather        Social History     Social History     Marital status: Single     Spouse name: N/A     Number of children: N/A     Years of education: N/A     Occupational History     Not on file.     Social History Main Topics     Smoking status: Current Some Day Smoker     Packs/day: 0.50     Years: 1.00     Types: Cigars     Smokeless tobacco: Never Used     Alcohol use Yes      Comment: socially     Drug use: No     Sexual activity: Not Currently     Partners: Female     Other Topics Concern     Parent/Sibling W/ Cabg, Mi Or Angioplasty Before 65f 55m? No     Social History Narrative       Current Outpatient Prescriptions   Medication Sig Dispense Refill     naproxen (NAPROSYN) 500 MG tablet Take 1 tablet (500 mg) by mouth 2 times daily as needed for moderate pain 30 tablet 1     imiquimod (ALDARA) 5 % cream Apply a small sized amount to warts or molluscum three times weekly at bedtime.   Wash off after 8 hours.   May use for up to 16 weeks. 12 packet 3     omeprazole (PRILOSEC) 40 MG capsule Take 1 capsule (40 mg) by mouth daily Take 30-60 minutes before a meal. (Patient not taking: Reported on 4/17/2017) 30 capsule 1       Allergies   Allergen Reactions     No Known Drug Allergies   "      REVIEW OF SYSTEMS:  CONSTITUTIONAL:  NEGATIVE for fever, chills, change in weight, not feeling tired  SKIN:  NEGATIVE for worrisome rashes, no skin lumps, no skin ulcers and no non-healing wounds  EYES:  NEGATIVE for vision changes or irritation.  ENT/MOUTH:  NEGATIVE.  No hearing loss, no hoarseness, no difficulty swallowing.  RESP:  NEGATIVE. No cough or shortness of breath.  BREAST:  NEGATIVE for masses, tenderness or discharge  CV:  NEGATIVE for chest pain, palpitations or peripheral edema  GI:  NEGATIVE for nausea, abdominal pain, heartburn, or change in bowel habits  :  Negative. No dysuria, no hematuria  MUSCULOSKELETAL:  See HPI above  NEURO:  NEGATIVE . No headaches, no dizziness,  no numbness  ENDOCRINE:  NEGATIVE for temperature intolerance, skin/hair changes  HEME/ALLERGY/IMMUNE:  NEGATIVE for bleeding problems  PSYCHIATRIC:  NEGATIVE. no anxiety, no depression.     Exam:  Vitals: Resp 15  Ht 1.638 m (5' 4.5\")  Wt 79.8 kg (176 lb)  BMI 29.74 kg/m2  BMI= Body mass index is 29.74 kg/(m^2).  Constitutional:  healthy, alert and no distress  Neuro: Alert and Oriented x 3, Gait normal. Sensation grossly WNL.  Psych: Affect normal   Respiratory: Breathing not labored.  Cardiovascular: normal peripheral pulses  Lymph: no adenopathy  Skin: No rashes,worrisome lesions or skin problems      Again, thank you for allowing me to participate in the care of your patient.        Sincerely,              Redd Aceves MD    "

## 2017-06-08 ENCOUNTER — PRE VISIT (OUTPATIENT)
Dept: ORTHOPEDICS | Facility: CLINIC | Age: 27
End: 2017-06-08

## 2017-06-08 NOTE — TELEPHONE ENCOUNTER
Pt has an appointment for Right wrist pain with ulnar minus wrist variants, onset 04/17/2017.  Less pain since started wearing wrist. Not really having pain, but would still like to be seen.   1. Do you have recent xrays (if not seen in EPIC)?Yes - Xray are in EPIC.  2. Do you have any MRI's (if not seen in EPIC)? No.   3. Have you had surgery in the past for the same issue?No.   4. Are you being referred by another provider? Yes: Dr. Aceves  If yes-Records in Epic or being obtained by: in epic.  5. Is this work comp or MVA related?  No.  6. Did you have an EMG test? No.      Bashir Alvarado RN

## 2017-06-12 ENCOUNTER — OFFICE VISIT (OUTPATIENT)
Dept: ORTHOPEDICS | Facility: CLINIC | Age: 27
End: 2017-06-12
Payer: COMMERCIAL

## 2017-06-12 VITALS
HEIGHT: 64 IN | BODY MASS INDEX: 29.02 KG/M2 | DIASTOLIC BLOOD PRESSURE: 76 MMHG | WEIGHT: 170 LBS | HEART RATE: 74 BPM | SYSTOLIC BLOOD PRESSURE: 118 MMHG | OXYGEN SATURATION: 99 %

## 2017-06-12 DIAGNOSIS — M25.531 RIGHT WRIST PAIN: Primary | ICD-10-CM

## 2017-06-12 PROCEDURE — 99213 OFFICE O/P EST LOW 20 MIN: CPT | Performed by: ORTHOPAEDIC SURGERY

## 2017-06-12 ASSESSMENT — PAIN SCALES - GENERAL: PAINLEVEL: NO PAIN (0)

## 2017-06-12 NOTE — PROGRESS NOTES
ORTHOPEDIC CLINIC NOTE      CHIEF COMPLAINT:  Right wrist pain.      HISTORY OF PRESENT ILLNESS:  Mr. Quintanilla is a 26-year-old right-hand-dominant male who presents today with a 3-4 week history of right wrist pain without any known trauma.  He awoke one morning with significant pain in the wrist.  He initially went to an Urgent Care and was subsequently referred to Dr. Aceves.  At that time radiographs were obtained, and there was concern for possible ulnar negative variance.  He was subsequently referred to my clinic.  He states that he is wearing a volar wrist brace, and this has helped his symptoms.  He has also been taking naproxen for the first 2 weeks, but subsequently discontinued the medication because his pain has decreased.  He has noted significant improvement in his symptoms.  He primarily has volar radial wrist pain.  He denies any numbness or tingling in his fingers.  No pain in any other location.  No persistent swelling or ecchymosis.      REVIEW OF SYSTEMS:  A 14-point review of systems was obtained today on the Intake Form and is included in the medical record.  Pertinent positives include anxiety and depression.      PAST MEDICAL HISTORY:   1.  Anxiety.   2.  Depression.      PAST SURGICAL HISTORY:  Appendectomy.      MEDICATIONS:   1.  Prilosec.   2.  Aldara cream as needed.      ALLERGIES:  No known drug allergies.      SOCIAL HISTORY:  The patient lives with his sister and brother-in-law, as well as a roommate.  He works as an  for [employer].  He has done so for the last 4 years.  He smoked a half pack of cigarettes per day for 6 years, but has been tobacco free for 4 months.  He drinks alcohol occasionally.      FAMILY HISTORY:  Notable for grandparents with breast and colon cancer, and a father and grandparents with type 2 diabetes mellitus.  The remainder of the family history is unremarkable.      PHYSICAL EXAMINATION:   VITAL SIGNS:  The patient is 5 feet 4 inches tall and  weighs 170 pounds.  He rates his pain as 0/10.  Blood pressure is 118/78 and pulse 74.  His QuickDASH today is 4.55.    STRENGTH:   strengths are 85/90/65 pounds on the right and 75/80/60 pounds on the left.   GENERAL:  The patient is a healthy-appearing adult male in no acute distress.  He is alert and oriented x3.   HEENT:  His head is normocephalic and atraumatic.  Extraocular movements are intact.   NECK:  Full range of motion without pain.   RESPIRATORY:  Breathing is regular and nonlabored.   EXTREMITIES:  Examination of his bilateral upper extremities reveals full shoulder, elbow, forearm, and wrist range of motion.  Closer evaluation of the right side reveals no swelling or ecchymosis.  He has a negative Collins and a negative ulnocarpal impaction test.  He has a negative Finkelstein's.  His DRUJ is stable in both pronation and supination.  His ECU is stable with resisted pronation and supination.  There is no point tenderness on exam, including anatomic snuff box, ulnar snuff box or scapholunate interval.  He has a negative carpal compression test, negative Phalen's, and negative Tinel's at the wrist.  He has 5/5 finger abduction, EPL and FPL.  His sensation is intact to light touch in all dermatomes, and the fingers are warm and well perfused with capillary refill less than 2 seconds.  His skin is intact without any open wounds, rashes or abrasions.      RADIOGRAPHIC IMAGING:  Three views of the right wrist from 04/17/2017 were available for review.  These do not demonstrate any acute fractures or dislocations.  He does have ulnar negative variance measuring approximately 3 mm.  No evidence for avascular necrosis of the lunate.  No evidence for osteoarthritis.      ASSESSMENT:  The patient is a 26-year-old right-hand-dominant male with a 1-month history of right wrist pain that is improving with immobilization and anti-inflammatories.      PLAN:  I have reassured Mr. Quintanilla that I do not see any  findings that are imminently concerning.  I would recommend a gradual wean from his brace and return to full activities.  He can use the naproxen as needed if his symptoms were to increase during this process.  We briefly discussed the role of an MRI, but I think at this point, being that he is improving, we will hold off.  He will plan to follow up with me as he deems appropriate or if his symptoms return or are worsening.

## 2017-06-12 NOTE — PATIENT INSTRUCTIONS
Thanks for coming today.  Ortho/Sports Medicine Clinic  [address] [place] [address]    To schedule future appointments in Ortho Clinic, you may call [phone].    To schedule ordered imaging by your provider:   Call [facility] [phone]    To schedule an injection ordered by your provider:  Call [facility] Injection scheduling line: [phone]  [facility] available online at:  [email or web address]    Please call if any further questions or concerns ([phone]).  Clinic hours 8 am to 5 pm.    Return to clinic (call) if symptoms worsen or fail to improve.

## 2017-06-12 NOTE — MR AVS SNAPSHOT
After Visit Summary   2017    Matt Quintanilla    MRN: [id number]           Patient Information     Date Of Birth          1990        Visit Information        Provider Department      2017 1:15 PM Johana Harrison MD [facility]        Today's Diagnoses     Right wrist pain    -  1      Care Instructions    Thanks for coming today.  [facility]  [address] [place] [address]    To schedule future appointments in Ortho Clinic, you may call [phone].    To schedule ordered imaging by your provider:   Call Central Imaging [phone]    To schedule an injection ordered by your provider:  Call Central [facility] Injection scheduling line: [phone]  [employer] available online at:  [email or web address]    Please call if any further questions or concerns ([phone]).  Clinic hours 8 am to 5 pm.    Return to clinic (call) if symptoms worsen or fail to improve.            Follow-ups after your visit        Who to contact     If you have questions or need follow up information about today's clinic visit or your schedule please contact [facility] directly at [phone].  Normal or non-critical lab and imaging results will be communicated to you by Cortexhart, letter or phone within 4 business days after the clinic has received the results. If you do not hear from us within 7 days, please contact the clinic through Cortexhart or phone. If you have a critical or abnormal lab result, we will notify you by phone as soon as possible.  Submit refill requests through [employer] or call your pharmacy and they will forward the refill request to us. Please allow 3 business days for your refill to be completed.          Additional Information About Your Visit        Cortexhar3dim Information     [employer] is an electronic gateway that provides easy, online access to your medical records. With [employer], you can request a clinic  "appointment, read your test results, renew a prescription or communicate with your care team.     To sign up for Tel[employer] visit the website at [email or web address]   You will be asked to enter the access code listed below, as well as some personal information. Please follow the directions to create your username and password.     Your access code is: [email or web address]  Expires: 2017  3:03 PM     Your access code will  in 90 days. If you need help or a new code, please contact your [facility] or call [phone] for assistance.        Care EveryWhere ID     This is your Care EveryWhere ID. This could be used by other organizations to access your [place] medical records  [id number]        Your Vitals Were     Pulse Height Pulse Oximetry BMI (Body Mass Index)          74 1.626 m (5' 4\") 99% 29.18 kg/m2         Blood Pressure from Last 3 Encounters:   17 118/76   17 135/80   17 130/74    Weight from Last 3 Encounters:   17 77.1 kg (170 lb)   17 79.8 kg (176 lb)   17 77.2 kg (170 lb 3.2 oz)              Today, you had the following     No orders found for display       Primary Care Provider Office Phone # Fax #    Argelia Cindy Naranjo [phone]       [facility] [address]  [facility] [address]        Equal Access to Services     KRISTOFER KAUR AH: Hadii aad ku hadasho Soomaali, waaxda luqadaha, qaybta kaalmada adeegyada, waxay idiin corin bautista. So [facility] [phone].    ATENCIÓN: Si habla barañol, tiene a kumari disposición servicios gratuitos de asistencia lingüística. Llame al [phone].    We comply with applicable federal civil rights laws and [place] laws. We do not discriminate on the basis of race, color, national origin, age, disability sex, sexual orientation or gender identity.            Thank you!     Thank you for choosing [facility]  for your care. Our " goal is always to provide you with excellent care. Hearing back from our patients is one way we can continue to improve our services. Please take a few minutes to complete the written survey that you may receive in the mail after your visit with us. Thank you!             Your Updated Medication List - Protect others around you: Learn how to safely use, store and throw away your medicines at www.disposemymeds.org.          This list is accurate as of: 6/12/17 11:59 PM.  Always use your most recent med list.                   Brand Name Dispense Instructions for use Diagnosis    imiquimod 5 % cream    ALDARA    12 packet    Apply a small sized amount to warts or molluscum three times weekly at bedtime.   Wash off after 8 hours.   May use for up to 16 weeks.    Condyloma acuminata       naproxen 500 MG tablet    NAPROSYN    30 tablet    Take 1 tablet (500 mg) by mouth 2 times daily as needed for moderate pain    Right wrist pain       omeprazole 40 MG capsule    priLOSEC    30 capsule    Take 1 capsule (40 mg) by mouth daily Take 30-60 minutes before a meal.    Gastroesophageal reflux disease with esophagitis

## 2021-08-10 ENCOUNTER — OFFICE VISIT (OUTPATIENT)
Dept: FAMILY MEDICINE | Facility: CLINIC | Age: 31
End: 2021-08-10
Payer: COMMERCIAL

## 2021-08-10 VITALS
SYSTOLIC BLOOD PRESSURE: 127 MMHG | HEART RATE: 63 BPM | WEIGHT: 179.2 LBS | HEIGHT: 65 IN | RESPIRATION RATE: 19 BRPM | OXYGEN SATURATION: 98 % | BODY MASS INDEX: 29.85 KG/M2 | DIASTOLIC BLOOD PRESSURE: 82 MMHG | TEMPERATURE: 98.4 F

## 2021-08-10 DIAGNOSIS — F33.9 RECURRENT MAJOR DEPRESSIVE DISORDER, REMISSION STATUS UNSPECIFIED (H): ICD-10-CM

## 2021-08-10 DIAGNOSIS — F41.9 ANXIETY: Primary | ICD-10-CM

## 2021-08-10 PROCEDURE — 96127 BRIEF EMOTIONAL/BEHAV ASSMT: CPT | Mod: 59 | Performed by: FAMILY MEDICINE

## 2021-08-10 PROCEDURE — 99204 OFFICE O/P NEW MOD 45 MIN: CPT | Performed by: FAMILY MEDICINE

## 2021-08-10 ASSESSMENT — ANXIETY QUESTIONNAIRES
1. FEELING NERVOUS, ANXIOUS, OR ON EDGE: MORE THAN HALF THE DAYS
7. FEELING AFRAID AS IF SOMETHING AWFUL MIGHT HAPPEN: NEARLY EVERY DAY
6. BECOMING EASILY ANNOYED OR IRRITABLE: MORE THAN HALF THE DAYS
2. NOT BEING ABLE TO STOP OR CONTROL WORRYING: NEARLY EVERY DAY
IF YOU CHECKED OFF ANY PROBLEMS ON THIS QUESTIONNAIRE, HOW DIFFICULT HAVE THESE PROBLEMS MADE IT FOR YOU TO DO YOUR WORK, TAKE CARE OF THINGS AT HOME, OR GET ALONG WITH OTHER PEOPLE: VERY DIFFICULT
5. BEING SO RESTLESS THAT IT IS HARD TO SIT STILL: MORE THAN HALF THE DAYS
3. WORRYING TOO MUCH ABOUT DIFFERENT THINGS: NEARLY EVERY DAY
GAD7 TOTAL SCORE: 17

## 2021-08-10 ASSESSMENT — MIFFLIN-ST. JEOR: SCORE: 1686.6

## 2021-08-10 ASSESSMENT — PATIENT HEALTH QUESTIONNAIRE - PHQ9
SUM OF ALL RESPONSES TO PHQ QUESTIONS 1-9: 19
5. POOR APPETITE OR OVEREATING: MORE THAN HALF THE DAYS

## 2021-08-10 NOTE — PROGRESS NOTES
Assessment & Plan       ICD-10-CM    1. Anxiety  F41.9 sertraline (ZOLOFT) 50 MG tablet   2. Recurrent major depressive disorder, remission status unspecified (H)  F33.9 sertraline (ZOLOFT) 50 MG tablet     Discussed That he needs to Limit alcohol use to no more than 1 drink a day or 1 beer/d  SEE EPIC care orders  The potential side effects of this medication have been discussed with the patient.  Call if any significant problems with these are experienced.  Pt is seeing a Therapist  If he gets agitated or suicidal on meds he should stop and call me   :[id number]}  Return in about 3 weeks (around 8/31/2021) for recheck/Please schedule appointment.    Rachna Larios MD  [facility] KALEY Gutierrez is a 31 year old who presents for the following health issues       Abnormal Mood Symptoms  Onset/Duration: several years and worse in the last 2 months  Works at [employer]  Description: as above  Depression (if yes, do PHQ-9): YES  Anxiety (if yes, do JESSEE-7): YES  Accompanying Signs & Symptoms:  Still participating in activities that you used to enjoy: sabinometimes  Fatigue: yes  Irritability: YES  Difficulty concentrating: YES  Changes in appetite: YES  Problems with sleep: YES  Heart racing/beating fast: no  Abnormally elevated, expansive, or irritable mood: no  Persistently increased activity or energy: no  Thoughts of hurting yourself or others: no  History:  Recent stress or major life event: stress at work  Has Problems with extended family  Things with wife are Good  Prior depression or anxiety: yes  Family history of depression or anxiety: mom has anxiety  Alcohol/drug use: 2 -3 alcoholic Drinks daily  Difficulty sleeping: YES  Precipitating or alleviating factors: as above  Therapies tried and outcome: pt is in Therapy  PHQ 9/9/2016 8/10/2021   PHQ-9 Total Score 11 19   Q9: Thoughts of better off dead/self-harm past 2 weeks More than half the days Not at all      JESSEE-7 SCORE 9/9/2016 8/10/2021   Total Score 16 17         Social History     Tobacco Use     Smoking status: Current Some Day Smoker     Packs/day: 0.50     Years: 1.00     Pack years: 0.50     Types: Cigars     Smokeless tobacco: Never Used   Substance Use Topics     Alcohol use: Yes     Comment: socially     Drug use: No     JESSEE-7 SCORE 9/9/2016 8/10/2021   Total Score 16 17     PHQ 9/9/2016 8/10/2021   PHQ-9 Total Score 11 19   Q9: Thoughts of better off dead/self-harm past 2 weeks More than half the days Not at all     Last PHQ-9 8/10/2021   1.  Little interest or pleasure in doing things 2   2.  Feeling down, depressed, or hopeless 3   3.  Trouble falling or staying asleep, or sleeping too much 3   4.  Feeling tired or having little energy 3   5.  Poor appetite or overeating 1   6.  Feeling bad about yourself 3   7.  Trouble concentrating 2   8.  Moving slowly or restless 2   Q9: Thoughts of better off dead/self-harm past 2 weeks 0   PHQ-9 Total Score 19   Difficulty at work, home, or with people Very difficult     JESSEE-7  8/10/2021   1. Feeling nervous, anxious, or on edge 2   2. Not being able to stop or control worrying 3   3. Worrying too much about different things 3   4. Trouble relaxing 2   5. Being so restless that it is hard to sit still 2   6. Becoming easily annoyed or irritable 2   7. Feeling afraid, as if something awful might happen 3   JESSEE-7 Total Score 17   If you checked any problems, how difficult have they made it for you to do your work, take care of things at home, or get along with other people? Very difficult         How many servings of fruits and vegetables do you eat daily?  0-1    On average, how many sweetened beverages do you drink each day (Examples: soda, juice, sweet tea, etc.  Do NOT count diet or artificially sweetened beverages)?   1 juice    How many days per week do you exercise enough to make your heart beat faster? 7    How many minutes a day do you exercise enough to  "make your heart beat faster? 10 - 19    How many days per week do you miss taking your medication? 0   Review of Systems   Rest of the ROS is Negative except see above and Problem list [stable]        Objective    /82   Pulse 63   Temp 98.4  F (36.9  C) (Oral)   Resp 19   Ht 1.646 m (5' 4.8\")   Wt 81.3 kg (179 lb 3.2 oz)   SpO2 98%   BMI 30.00 kg/m    Body mass index is 30 kg/m .  Physical Exam   GENERAL: healthy, alert and no distress  NECK: no adenopathy, no asymmetry, masses, or scars and thyroid normal to palpation  RESP: lungs clear to auscultation - no rales, rhonchi or wheezes  CV: regular rate and rhythm, normal S1 S2, no S3 or S4, no murmur, click or rub, no peripheral edema and peripheral pulses strong  ABDOMEN: soft, nontender, no hepatosplenomegaly, no masses and bowel sounds normal  MS: no gross musculoskeletal defects noted, no edema  PSYCH: anxious, judgement and insight intact and appearance well groomed    Pt declined labs Today            "

## 2021-08-10 NOTE — PATIENT INSTRUCTIONS
Patient Education     Depression  Depression is one of the most common mental health problems today. It is not just a state of unhappiness or sadness. It is a true disease. The cause seems to be linked to a drop in chemicals that transmit signals in the brain. Having a family history of depression, alcoholism, or suicide increases the risk. Chronic illness, chronic pain, migraine headaches, and high emotional stress also increase the risk.   Depression is something we tend to recognize in others. But we may have a hard time seeing it in ourselves. It can show in many physical and emotional ways:     Loss of appetite    Overeating    Not being able to sleep    Sleeping too much    Excessive tiredness not linked to physical exertion    Restlessness or irritability    Slowness of movement or speech    Feeling depressed or withdrawn    Loss of interest in things you once enjoyed    Trouble concentrating, remembering, or making decisions    Thoughts of harming or killing oneself, or thoughts that life is not worth living    Low self-esteem  The treatment for depression may include both medicine and psychotherapy. Antidepressants can reduce suffering. They can also improve the ability to function during the depressed period. Therapy can offer emotional support. It can also help you understand emotional factors that may be causing the depression.   Home care    Ongoing care and support help people manage this disease. Find a healthcare provider and therapist who meet your needs. Seek help when you feel like you may be getting ill.    Be kind to yourself. Make it a point to do things that you enjoy (gardening, walking in nature, going to a movie). Reward yourself for small successes.    Once you start your medicine, expect a slow decrease in your symptoms. Depression will lift gradually, not right away. Ask your healthcare provider how long it will take for the medicine to start working.    Take care of your physical  body. Eat a balanced diet (low in saturated fat and high in fruits and vegetables). Exercise at least 3 times a week for 30 minutes. Even mild to moderate exercise (like brisk walking) can make you feel better.    Don't make major decisions, such as a job change, a divorce, or a marriage until you feel better.    Don't drink alcohol. It can make depression worse.    Take medicine as prescribed. Don't stop your medicine or adjust the dose unless you talk with your healthcare provider.    Don't share your medicine or use someone else's medicine.    Tell all your healthcare providers about all the prescription and over-the-counter medicines, vitamins, and supplements you take. Certain supplements interact with medicines. They can cause dangerous side effects. Ask your pharmacist about medicine interactions when you have questions.    Talk with your family and trusted friends about your feelings and thoughts. Ask them to help you notice behavior changes early. You can then get help and, if needed, your medicine can be adjusted.    Talk with your healthcare provider if you are not getting better. He or she may change your medicine or have you try another treatment.    Follow-up care  Follow up with your healthcare provider as advised.   Call 911  Call 911 if you:     Have suicidal thoughts, a suicide plan, and the means to carry out the plan    Have serious thoughts of hurting someone else    Have trouble breathing    Are very confused    Feel very drowsy or have trouble awakening    Faint or lose consciousness    Have new chest pain that becomes more severe, lasts longer, or spreads into your shoulder, arm, neck, jaw, or back  When to seek medical advice  Call your healthcare provider right away if any of these happen:    Worsening of your symptoms    Feeling extreme depression, fear, anxiety, or anger toward yourself or others    Feeling out of control    Feeling that you may try to harm yourself or another    Hearing  voices that others do not hear    Seeing things that others do not see    Not sleeping or eating for 3 days in a row    Having friends or family express concern over your behavior and ask you to seek help  [employer] last reviewed this educational content on 7/1/2020 2000-2021 [employer]. All rights reserved. This information is not intended as a substitute for professional medical care. Always follow your healthcare professional's instructions.           Patient Education     Zoloft Oral Tablet 50 mg   Uses  This medicine is used for the following purposes:    anxiety    depression    eating disorders    obsessive compulsive disorder    post-traumatic stress disorder  Instructions  This medicine may be taken with or without food.  It is very important that you take the medicine at about the same time every day. It will work best if you do this.  Keep the medicine at room temperature. Avoid heat and direct light.  It is important that you keep taking each dose of this medicine on time even if you are feeling well.  If you forget to take a dose on time, take it as soon as you remember. If it is almost time for the next dose, do not take the missed dose. Return to your normal dosing schedule. Do not take 2 doses of this medicine at one time.  Please tell your doctor and pharmacist about all the medicines you take. Include both prescription and over-the-counter medicines. Also tell them about any vitamins, herbal medicines, or anything else you take for your health.  Do not suddenly stop taking this medicine. Check with your doctor before stopping.  Cautions  Tell your doctor and pharmacist if you ever had an allergic reaction to a medicine. Symptoms of an allergic reaction can include trouble breathing, skin rash, itching, swelling, or severe dizziness.  Do not use the medication any more than instructed.  Your ability to stay alert or to react quickly may be impaired by this medicine. Do not drive or  operate machinery until you know how this medicine will affect you.  Please check with your doctor before drinking alcohol while on this medicine.  Family should check on the patient often. Call the doctor if patient becomes more depressed, has thoughts of suicide, or shows changes in behavior.  Call the doctor if there are any signs of confusion or unusual changes in behavior.  Tell the doctor or pharmacist if you are pregnant, planning to be pregnant, or breastfeeding.  Ask your pharmacist if this medicine can interact with any of your other medicines. Be sure to tell them about all the medicines you take.  Do not start or stop any other medicines without first speaking to your doctor or pharmacist.  Call your doctor right away if you notice any unusual bleeding or bruising.  If you have painful erection or an erection for more than 4 hours, seek medical care right away.  Do not share this medicine with anyone who has not been prescribed this medicine.  This medicine can cause serious side effects in some patients. Important information from the [place] Food and Drug Administration (FDA) is available from your pharmacist. Please review it carefully with your pharmacist to understand the risks associated with this medicine.  Side Effects  The following is a list of some common side effects from this medicine. Please speak with your doctor about what you should do if you experience these or other side effects.    agitated feeling or trouble sleeping    decreased appetite    diarrhea    dizziness    drowsiness or sedation    dry mouth    nausea    stomach upset or abdominal pain    sweating    weight loss  Call your doctor or get medical help right away if you notice any of these more serious side effects:    loss of balance    coughing up blood or vomit that looks like coffee grounds    pain in the eye    fainting    hallucinations (unusual thoughts, seeing or hearing things that are not real)    fast or irregular  heart beats    muscle weakness    dilation of the pupils    restlessness    problems with sexual functions or desire    shakiness    dark, tarry stool    blurring or changes of vision    severe or persistent vomiting    unexpected or extreme weight loss  A few people may have an allergic reactions to this medicine. Symptoms can include difficulty breathing, skin rash, itching, swelling, or severe dizziness. If you notice any of these symptoms, seek medical help quickly.  Extra  Please speak with your doctor, nurse, or pharmacist if you have any questions about this medicine.  [email or web address]  IMPORTANT NOTE: This document tells you briefly how to take your medicine, but it does not tell you all there is to know about it.Your doctor or pharmacist may give you other documents about your medicine. Please talk to them if you have any questions.Always follow their advice. There is a more complete description of this medicine available in English.Scan this code on your smartphone or tablet or use the web address below. You can also ask your pharmacist for a printout. If you have any questions, please ask your pharmacist.     2021 [employer].

## 2021-08-10 NOTE — LETTER
My Depression Action Plan  Name: Matt Quintanilla   Date of Birth 1990  Date: 8/10/2021    My doctor: Argelia Naranjo   My clinic: [facility]  [address]  [place] [address]  [phone]          GREEN    ZONE   Good Control    What it looks like:     Things are going generally well. You have normal ups and downs. You may even feel depressed from time to time, but bad moods usually last less than a day.   What you need to do:  1. Continue to care for yourself (see self care plan)  2. Check your depression survival kit and update it as needed  3. Follow your physician s recommendations including any medication.  4. Do not stop taking medication unless you consult with your physician first.           YELLOW         ZONE Getting Worse    What it looks like:     Depression is starting to interfere with your life.     It may be hard to get out of bed; you may be starting to isolate yourself from others.    Symptoms of depression are starting to last most all day and this has happened for several days.     You may have suicidal thoughts but they are not constant.   What you need to do:     1. Call your care team. Your response to treatment will improve if you keep your care team informed of your progress. Yellow periods are signs an adjustment may need to be made.     2. Continue your self-care.  Just get dressed and ready for the day.  Don't give yourself time to talk yourself out of it.    3. Talk to someone in your support network.    4. Open up your Depression Self-Care Plan/Wellness Kit.           RED    ZONE Medical Alert - Get Help    What it looks like:     Depression is seriously interfering with your life.     You may experience these or other symptoms: You can t get out of bed most days, can t work or engage in other necessary activities, you have trouble taking care of basic hygiene, or basic responsibilities, thoughts of suicide or death that will not  go away, self-injurious behavior.     What you need to do:  1. Call your care team and request a same-day appointment. If they are not available (weekends or after hours) call your local crisis line, emergency room or 911.          Depression Self-Care Plan / Wellness Kit    Many people find that medication and therapy are helpful treatments for managing depression. In addition, making small changes to your everyday life can help to boost your mood and improve your wellbeing. Below are some tips for you to consider. Be sure to talk with your medical provider and/or behavioral health consultant if your symptoms are worsening or not improving.     Sleep   Sleep hygiene  means all of the habits that support good, restful sleep. It includes maintaining a consistent bedtime and wake time, using your bedroom only for sleeping or sex, and keeping the bedroom dark and free of distractions like a computer, smartphone, or television.     Develop a Healthy Routine  Maintain good hygiene. Get out of bed in the morning, make your bed, brush your teeth, take a shower, and get dressed. Don t spend too much time viewing media that makes you feel stressed. Find time to relax each day.    Exercise  Get some form of exercise every day. This will help reduce pain and release endorphins, the  feel good  chemicals in your brain. It can be as simple as just going for a walk or doing some gardening, anything that will get you moving.      Diet  Strive to eat healthy foods, including fruits and vegetables. Drink plenty of water. Avoid excessive sugar, caffeine, alcohol, and other mood-altering substances.     Stay Connected with Others  Stay in touch with friends and family members.    Manage Your Mood  Try deep breathing, massage therapy, biofeedback, or meditation. Take part in fun activities when you can. Try to find something to smile about each day.     Psychotherapy  Be open to working with a therapist if your provider recommends it.      Medication  Be sure to take your medication as prescribed. Most anti-depressants need to be taken every day. It usually takes several weeks for medications to work. Not all medicines work for all people. It is important to follow-up with your provider to make sure you have a treatment plan that is working for you. Do not stop your medication abruptly without first discussing it with your provider.    Crisis Resources   These hotlines are for both adults and children. They and are open 24 hours a day, 7 days a week unless noted otherwise.      National Suicide Prevention Lifeline   6-031-624-TALK (3780)      Crisis Text Line    www.crisistextline.org  Text HOME to 420605 from anywhere in the [place], anytime, about any type of crisis. A live, trained crisis counselor will receive the text and respond quickly.      [place] Lifeline for LGBTQ Youth  A national crisis intervention and suicide lifeline for LGBTQ youth under 25. Provides a safe place to talk without judgement. Call 1-738.884.7324; text START to 566239 or visit www.thetrevorproject.org to talk to a trained counselor.      For [facility] crisis numbers, visit the [facility] website at:  https://[place].gov/dhs/people-we-serve/adults/health-care/mental-health/resources/crisis-contacts.jsp

## 2021-08-11 ASSESSMENT — ANXIETY QUESTIONNAIRES: GAD7 TOTAL SCORE: 17

## 2021-08-31 ENCOUNTER — OFFICE VISIT (OUTPATIENT)
Dept: FAMILY MEDICINE | Facility: CLINIC | Age: 31
End: 2021-08-31
Payer: COMMERCIAL

## 2021-08-31 VITALS
BODY MASS INDEX: 28.66 KG/M2 | HEART RATE: 84 BPM | SYSTOLIC BLOOD PRESSURE: 134 MMHG | OXYGEN SATURATION: 97 % | HEIGHT: 65 IN | DIASTOLIC BLOOD PRESSURE: 77 MMHG | WEIGHT: 172 LBS | TEMPERATURE: 98.5 F | RESPIRATION RATE: 16 BRPM

## 2021-08-31 DIAGNOSIS — Z11.59 NEED FOR HEPATITIS C SCREENING TEST: ICD-10-CM

## 2021-08-31 DIAGNOSIS — Z11.4 SCREENING FOR HIV (HUMAN IMMUNODEFICIENCY VIRUS): ICD-10-CM

## 2021-08-31 DIAGNOSIS — F33.9 RECURRENT MAJOR DEPRESSIVE DISORDER, REMISSION STATUS UNSPECIFIED (H): ICD-10-CM

## 2021-08-31 DIAGNOSIS — F41.9 ANXIETY: ICD-10-CM

## 2021-08-31 PROCEDURE — 99213 OFFICE O/P EST LOW 20 MIN: CPT | Performed by: FAMILY MEDICINE

## 2021-08-31 ASSESSMENT — MIFFLIN-ST. JEOR: SCORE: 1653.94

## 2021-08-31 NOTE — PROGRESS NOTES
"    Assessment & Plan     Anxiety  Doing well  - sertraline (ZOLOFT) 50 MG tablet; Take 1 tablet (50 mg) by mouth daily 1 tablet daily    Recurrent major depressive disorder, remission status unspecified (H)  Doing vry well  - sertraline (ZOLOFT) 50 MG tablet; Take 1 tablet (50 mg) by mouth daily 1 tablet daily  BMI:   Estimated body mass index is 28.8 kg/m  as calculated from the following:    Height as of this encounter: 1.646 m (5' 4.8\").    Weight as of this encounter: 78 kg (172 lb).   Weight management plan: Discussed healthy diet and exercise guidelines        Return in about 6 months (around 2/28/2022) for recheck/Please schedule appointment.    Rachna Larios MD  [facility] KALEY Gutierrez is a 31 year old who presents for the following health issues     History of Present Illness       Mental Health Follow-up:  Patient presents to follow-up on Depression & Anxiety.Patient's depression since last visit has been:  Better  The patient is not having other symptoms associated with depression.  Patient's anxiety since last visit has been:  Better  The patient is not having other symptoms associated with anxiety.  Any significant life events: No  Patient is not feeling anxious or having panic attacks.  Patient has no concerns about alcohol or drug use.     Social History  Tobacco Use    Smoking status: Current Some Day Smoker      Packs/day: 0.50      Years: 1.00      Pack years: .5      Types: Cigars    Smokeless tobacco: Never Used  Alcohol use: Yes    Comment: socially  Drug use: No      Today's PHQ-9         PHQ-9 Total Score:         PHQ-9 Q9 Thoughts of better off dead/self-harm past 2 weeks :       Thoughts of suicide or self harm:      Self-harm Plan:        Self-harm Action:          Safety concerns for self or others:           He eats 2-3 servings of fruits and vegetables daily.He consumes 0 sweetened beverage(s) daily.He exercises with enough effort to increase his heart rate " 9 or less minutes per day.  He exercises with enough effort to increase his heart rate 3 or less days per week.   He is taking medications regularly.       Depression and Anxiety Follow-Up    How are you doing with your depression since your last visit? Improved     How are you doing with your anxiety since your last visit?  Improved     Are you having other symptoms that might be associated with depression or anxiety? No    Have you had a significant life event? No     Do you have any concerns with your use of alcohol or other drugs? No    Social History     Tobacco Use     Smoking status: Current Some Day Smoker     Packs/day: 0.50     Years: 1.00     Pack years: 0.50     Types: Cigars     Smokeless tobacco: Never Used   Substance Use Topics     Alcohol use: Yes     Comment: socially     Drug use: No     PHQ 9/9/2016 8/10/2021   PHQ-9 Total Score 11 19   Q9: Thoughts of better off dead/self-harm past 2 weeks More than half the days Not at all     JESSEE-7 SCORE 9/9/2016 8/10/2021   Total Score 16 17     Last PHQ-9 8/10/2021   1.  Little interest or pleasure in doing things 2   2.  Feeling down, depressed, or hopeless 3   3.  Trouble falling or staying asleep, or sleeping too much 3   4.  Feeling tired or having little energy 3   5.  Poor appetite or overeating 1   6.  Feeling bad about yourself 3   7.  Trouble concentrating 2   8.  Moving slowly or restless 2   Q9: Thoughts of better off dead/self-harm past 2 weeks 0   PHQ-9 Total Score 19   Difficulty at work, home, or with people Very difficult     JESSEE-7  8/10/2021   1. Feeling nervous, anxious, or on edge 2   2. Not being able to stop or control worrying 3   3. Worrying too much about different things 3   4. Trouble relaxing 2   5. Being so restless that it is hard to sit still 2   6. Becoming easily annoyed or irritable 2   7. Feeling afraid, as if something awful might happen 3   JESSEE-7 Total Score 17   If you checked any problems, how difficult have they made it  "for you to do your work, take care of things at home, or get along with other people? Very difficult     Review of Systems   Rest of the ROS is Negative except see above and Problem list [stable]        Objective    /77   Pulse 84   Temp 98.5  F (36.9  C) (Oral)   Resp 16   Ht 1.646 m (5' 4.8\")   Wt 78 kg (172 lb)   SpO2 97%   BMI 28.80 kg/m    Body mass index is 28.8 kg/m .  Physical Exam   GENERAL: healthy, alert and no distress  PSYCH: mentation appears normal, affect normal/bright          "

## 2021-09-01 ASSESSMENT — PATIENT HEALTH QUESTIONNAIRE - PHQ9
SUM OF ALL RESPONSES TO PHQ QUESTIONS 1-9: 0
5. POOR APPETITE OR OVEREATING: SEVERAL DAYS

## 2021-09-01 ASSESSMENT — ANXIETY QUESTIONNAIRES
7. FEELING AFRAID AS IF SOMETHING AWFUL MIGHT HAPPEN: SEVERAL DAYS
3. WORRYING TOO MUCH ABOUT DIFFERENT THINGS: NOT AT ALL
IF YOU CHECKED OFF ANY PROBLEMS ON THIS QUESTIONNAIRE, HOW DIFFICULT HAVE THESE PROBLEMS MADE IT FOR YOU TO DO YOUR WORK, TAKE CARE OF THINGS AT HOME, OR GET ALONG WITH OTHER PEOPLE: NOT DIFFICULT AT ALL
2. NOT BEING ABLE TO STOP OR CONTROL WORRYING: NOT AT ALL
6. BECOMING EASILY ANNOYED OR IRRITABLE: SEVERAL DAYS
GAD7 TOTAL SCORE: 4
1. FEELING NERVOUS, ANXIOUS, OR ON EDGE: SEVERAL DAYS
5. BEING SO RESTLESS THAT IT IS HARD TO SIT STILL: NOT AT ALL

## 2021-09-02 ASSESSMENT — ANXIETY QUESTIONNAIRES: GAD7 TOTAL SCORE: 4

## 2021-11-29 ENCOUNTER — E-VISIT (OUTPATIENT)
Dept: URGENT CARE | Facility: CLINIC | Age: 31
End: 2021-11-29
Payer: COMMERCIAL

## 2021-11-29 DIAGNOSIS — Z20.822 ENCOUNTER FOR LABORATORY TESTING FOR COVID-19 VIRUS: Primary | ICD-10-CM

## 2021-11-29 PROCEDURE — 99421 OL DIG E/M SVC 5-10 MIN: CPT | Performed by: PHYSICIAN ASSISTANT

## 2021-11-29 NOTE — PATIENT INSTRUCTIONS
Dear Matt Quintanilla,    Based on your responses, we have ordered COVID-19 (coronavirus) testing for you. Testing is recommended for people without symptoms in a number of different situations. These reasons include testing prior to air travel, testing after international travel, periodic testing for people who are attending in-person school, and testing related to participation in activities such as sports.     How to schedule:  Go to your [email or web address] home page and scroll down to the section that says  You have an appointment that needs to be scheduled  and click the large green button that says  Schedule Now  and follow the steps to find the next available opening.     If you are unable to complete these [email or web address] scheduling steps, please call [phone] to schedule your testing.      Know the test result takes usually 1-2 days to return but occasionally takes longer (over 95% are done within 72 hours).The results are available on [email or web address] right when the lab is completed. We will also call all people who have positive results.    What are the symptoms of COVID-19?  The most common symptoms are cough, fever and trouble breathing. Less common symptoms include headache, body aches, fatigue (feeling very tired), chills, sore throat, stuffy or runny nose, diarrhea (loose poop), loss of taste or smell, belly pain, and nausea or vomiting (feeling sick to your stomach or throwing up).    If you develop symptoms of COVID-19, you should be re-evaluated to see if retesting would be recommended.     Where can I get more information?  [facility] - About COVID-19: [email or web address]  CDC - What to Do If You're Sick: www.cdc.gov/coronavirus/2019-ncov/about/steps-when-sick.html  CDC - Ending Home Isolation: www.cdc.gov/coronavirus/2019-ncov/hcp/disposition-in-home-patients.html  CDC - Caring for Someone: www.cdc.gov/coronavirus/2019-ncov/if-you-are-sick/care-for-someone.html  [facility]  trials (COVID-19 research studies): clinicalaffairs.[facility]/n-clinical-trials  Below are the COVID-19 hotlines at the [place] Department of Health ([facility]). Interpreters are available.  For health questions: Call 573-263-5437 or 1-611.480.1904 (7 a.m. to 7 p.m.)  For questions about schools and childcare: Call 880-265-0921 or 1-829.584.3410 (7 a.m. to 7 p.m.)

## 2021-11-30 ENCOUNTER — LAB (OUTPATIENT)
Dept: LAB | Facility: CLINIC | Age: 31
End: 2021-11-30
Attending: PHYSICIAN ASSISTANT
Payer: COMMERCIAL

## 2021-11-30 DIAGNOSIS — Z20.822 ENCOUNTER FOR LABORATORY TESTING FOR COVID-19 VIRUS: ICD-10-CM

## 2021-11-30 PROCEDURE — U0003 INFECTIOUS AGENT DETECTION BY NUCLEIC ACID (DNA OR RNA); SEVERE ACUTE RESPIRATORY SYNDROME CORONAVIRUS 2 (SARS-COV-2) (CORONAVIRUS DISEASE [COVID-19]), AMPLIFIED PROBE TECHNIQUE, MAKING USE OF HIGH THROUGHPUT TECHNOLOGIES AS DESCRIBED BY CMS-2020-01-R: HCPCS

## 2021-11-30 PROCEDURE — U0005 INFEC AGEN DETEC AMPLI PROBE: HCPCS

## 2021-12-01 LAB — SARS-COV-2 RNA RESP QL NAA+PROBE: NEGATIVE

## 2022-01-16 ENCOUNTER — HEALTH MAINTENANCE LETTER (OUTPATIENT)
Age: 32
End: 2022-01-16

## 2022-07-06 ENCOUNTER — TELEPHONE (OUTPATIENT)
Dept: FAMILY MEDICINE | Facility: CLINIC | Age: 32
End: 2022-07-06

## 2022-07-06 NOTE — TELEPHONE ENCOUNTER
Patient Quality Outreach    Patient is due for the following:   Depression  -  PHQ-9 Needed  Physical  - never done    NEXT STEPS:   Schedule a yearly physical and complete PHQ-9 sent via afterBOT    Type of outreach:    Sent after[employer]OT message.    Next Steps:  Reach out within 90 days via Letter.    Max number of attempts reached: No. Will try again in 90 days if patient still on fail list.    Questions for provider review:    None     Carrie Scruggs MA

## 2023-01-08 ENCOUNTER — HEALTH MAINTENANCE LETTER (OUTPATIENT)
Age: 33
End: 2023-01-08

## 2023-04-23 ENCOUNTER — HEALTH MAINTENANCE LETTER (OUTPATIENT)
Age: 33
End: 2023-04-23

## 2024-06-29 ENCOUNTER — HEALTH MAINTENANCE LETTER (OUTPATIENT)
Age: 34
End: 2024-06-29

## 2024-09-26 ENCOUNTER — OFFICE VISIT (OUTPATIENT)
Dept: URGENT CARE | Facility: URGENT CARE | Age: 34
End: 2024-09-26
Payer: COMMERCIAL

## 2024-09-26 VITALS
WEIGHT: 192 LBS | TEMPERATURE: 99 F | OXYGEN SATURATION: 95 % | BODY MASS INDEX: 32.15 KG/M2 | DIASTOLIC BLOOD PRESSURE: 88 MMHG | SYSTOLIC BLOOD PRESSURE: 138 MMHG | RESPIRATION RATE: 18 BRPM | HEART RATE: 84 BPM

## 2024-09-26 DIAGNOSIS — R03.0 ELEVATED BLOOD PRESSURE READING WITHOUT DIAGNOSIS OF HYPERTENSION: ICD-10-CM

## 2024-09-26 DIAGNOSIS — M79.671 ACUTE FOOT PAIN, RIGHT: Primary | ICD-10-CM

## 2024-09-26 PROCEDURE — 86140 C-REACTIVE PROTEIN: CPT | Performed by: PHYSICIAN ASSISTANT

## 2024-09-26 PROCEDURE — 36415 COLL VENOUS BLD VENIPUNCTURE: CPT | Performed by: PHYSICIAN ASSISTANT

## 2024-09-26 PROCEDURE — 99203 OFFICE O/P NEW LOW 30 MIN: CPT | Performed by: PHYSICIAN ASSISTANT

## 2024-09-26 PROCEDURE — 84550 ASSAY OF BLOOD/URIC ACID: CPT | Performed by: PHYSICIAN ASSISTANT

## 2024-09-26 RX ORDER — PREDNISONE 20 MG/1
40 TABLET ORAL DAILY
Qty: 14 TABLET | Refills: 0 | Status: SHIPPED | OUTPATIENT
Start: 2024-09-26 | End: 2024-10-03

## 2024-09-26 RX ORDER — PREDNISONE 20 MG/1
40 TABLET ORAL DAILY
Qty: 14 TABLET | Refills: 0 | Status: SHIPPED | OUTPATIENT
Start: 2024-09-26 | End: 2024-09-26

## 2024-09-26 ASSESSMENT — PAIN SCALES - GENERAL: PAINLEVEL: EXTREME PAIN (8)

## 2024-09-26 ASSESSMENT — ENCOUNTER SYMPTOMS
PALPITATIONS: 0
COLOR CHANGE: 0
NUMBNESS: 0
CHILLS: 0
FATIGUE: 0
NECK STIFFNESS: 0
MYALGIAS: 1
JOINT SWELLING: 1
FEVER: 0
CARDIOVASCULAR NEGATIVE: 1
NECK PAIN: 0
BACK PAIN: 0
ARTHRALGIAS: 1
WOUND: 0

## 2024-09-27 LAB
CRP SERPL-MCNC: 9.3 MG/L
URATE SERPL-MCNC: 9.2 MG/DL (ref 3.4–7)

## 2024-09-27 NOTE — PROGRESS NOTES
Pantera Gutierrez is a 34 year old, presenting for the following health issues with spouse:  Musculoskeletal Problem (Right foot pain started a day ago, no injury )    HPI   Musculoskeletal problem/pain  Onset/Duration: 2-3days  Description  Location: R foot  Joint Swelling: Yes  Redness: Yes  Pain: YES  Warmth: no  Intensity:  moderate  Progression of Symptoms:  same  Accompanying signs and symptoms:   Fevers: no  Numbness/tingling/weakness: no  History  Trauma to the area: no known trauma or injuries. No excessive ETOH, red meat or seafood consumption.  Family hx of gout.  Recent illness:  no  Previous similar problem: no  Previous evaluation:  no  Precipitating or alleviating factors:  Aggravating factors include: pressure, standing, walking, overuse  Therapies tried and outcome: rest/inactivity, immobilization, acetaminophen, Ibuprofen, with minimal relief    Patient Active Problem List   Diagnosis    Anxiety    Dysthymia    Tension-type headaches    Gastroesophageal reflux disease with esophagitis    Abdominal pain, epigastric    Tobacco use disorder    Condyloma acuminata    Recurrent major depressive disorder, remission status unspecified (H24)     Current Outpatient Medications   Medication Sig Dispense Refill    sertraline (ZOLOFT) 50 MG tablet Take 1 tablet (50 mg) by mouth daily 1 tablet daily (Patient not taking: Reported on 9/26/2024) 30 tablet 6     No current facility-administered medications for this visit.        Allergies   Allergen Reactions    No Known Drug Allergy      Review of Systems   Constitutional:  Negative for chills, fatigue and fever.   Cardiovascular: Negative.  Negative for chest pain, palpitations and leg swelling.   Musculoskeletal:  Positive for arthralgias, gait problem, joint swelling and myalgias. Negative for back pain, neck pain and neck stiffness.   Skin:  Negative for color change, pallor, rash and wound.   Neurological:  Negative for numbness.   All other systems  reviewed and are negative.          Objective    /88 (BP Location: Right arm, Patient Position: Sitting, Cuff Size: Adult Large)   Pulse 84   Temp 99  F (37.2  C) (Tympanic)   Resp 18   Wt 87.1 kg (192 lb)   SpO2 95%   BMI 32.15 kg/m    Body mass index is 32.15 kg/m .  Physical Exam  Vitals and nursing note reviewed.   Constitutional:       General: He is not in acute distress.     Appearance: Normal appearance. He is well-developed and normal weight. He is not ill-appearing.   Musculoskeletal:      Right ankle: Normal. No swelling, deformity, ecchymosis or lacerations. No tenderness. Normal range of motion.      Right Achilles Tendon: Normal.      Left ankle: Normal.      Left Achilles Tendon: Normal.      Right foot: Normal range of motion and normal capillary refill. Swelling, tenderness and bony tenderness (2nd and 3rd MTP joints) present. No deformity, laceration or crepitus. Normal pulse.      Left foot: Normal. Normal range of motion and normal capillary refill. No swelling, deformity, laceration, bony tenderness or crepitus. Normal pulse.   Skin:     General: Skin is warm.      Capillary Refill: Capillary refill takes less than 2 seconds.   Neurological:      Mental Status: He is alert and oriented to person, place, and time.      Sensory: Sensation is intact. No sensory deficit.      Motor: Motor function is intact.      Gait: Gait abnormal.      Deep Tendon Reflexes: Reflexes are normal and symmetric.   Psychiatric:         Mood and Affect: Mood normal.         Behavior: Behavior normal.         Thought Content: Thought content normal.         Judgment: Judgment normal.            Assessment/Plan:  Acute foot pain, right:  No precipitating trauma or injuries. ?gout.  Will check labs below.  Recommend RICE, low purine diet and will give [id number].  Recheck in clinic if symptoms worsen or if symptoms do not improve.   -     Uric acid; Future  -     CRP, inflammation; Future  -      predniSONE (DELTASONE) 20 MG tablet; Take 2 tablets (40 mg) by mouth daily for 7 days.    Elevated blood pressure reading without diagnosis of hypertension:  Repeat BP in clinic improved.  Most likely pain related.  Recheck in clinic if symptoms worsen or if symptoms do not improve.        Jennifer See BOWEN Fajardo

## 2025-01-28 ENCOUNTER — OFFICE VISIT (OUTPATIENT)
Dept: URGENT CARE | Facility: URGENT CARE | Age: 35
End: 2025-01-28

## 2025-01-28 ENCOUNTER — NURSE TRIAGE (OUTPATIENT)
Dept: FAMILY MEDICINE | Facility: CLINIC | Age: 35
End: 2025-01-28
Payer: COMMERCIAL

## 2025-01-28 VITALS
OXYGEN SATURATION: 96 % | SYSTOLIC BLOOD PRESSURE: 165 MMHG | RESPIRATION RATE: 18 BRPM | TEMPERATURE: 98.2 F | HEART RATE: 86 BPM | BODY MASS INDEX: 31.98 KG/M2 | DIASTOLIC BLOOD PRESSURE: 96 MMHG | WEIGHT: 191 LBS

## 2025-01-28 DIAGNOSIS — S61.211A LACERATION OF LEFT INDEX FINGER WITHOUT FOREIGN BODY WITHOUT DAMAGE TO NAIL, INITIAL ENCOUNTER: Primary | ICD-10-CM

## 2025-01-28 PROCEDURE — 90471 IMMUNIZATION ADMIN: CPT | Performed by: PHYSICIAN ASSISTANT

## 2025-01-28 PROCEDURE — 12001 RPR S/N/AX/GEN/TRNK 2.5CM/<: CPT | Performed by: PHYSICIAN ASSISTANT

## 2025-01-28 PROCEDURE — 90715 TDAP VACCINE 7 YRS/> IM: CPT | Performed by: PHYSICIAN ASSISTANT

## 2025-01-28 ASSESSMENT — ENCOUNTER SYMPTOMS
MYALGIAS: 1
NUMBNESS: 0
CHILLS: 0
FATIGUE: 0
FEVER: 0
ARTHRALGIAS: 1
WOUND: 0
BACK PAIN: 0
NECK PAIN: 0
COLOR CHANGE: 0
PALPITATIONS: 0
JOINT SWELLING: 1
NECK STIFFNESS: 0
CARDIOVASCULAR NEGATIVE: 1

## 2025-01-28 ASSESSMENT — PAIN SCALES - GENERAL: PAINLEVEL_OUTOF10: NO PAIN (0)

## 2025-01-28 NOTE — PROGRESS NOTES
Subjective   Matt is a 34 year old, presenting for the following health issues:  Trauma (Cut on left pointer finger - just wondering about tetanus booster )    HPI   Concern - finger laceration  Onset: today  Description: Sustained a laceration to his L index finger today after his finger got caught in the grill at work. Last Td was 2011.  Intensity: mild  Progression of Symptoms:  same  Accompanying Signs & Symptoms: No radicular pain, numbness, tingling or weakness. No swelling, redness, drainage or fevers.  R hand dominant.  Previous history of similar problem: no  Precipitating factors:        Worsened by: none  Alleviating factors:        Improved by: none  Therapies tried and outcome: compression, antibiotic ointment with some relief    Patient Active Problem List   Diagnosis    Anxiety    Dysthymia    Tension-type headaches    Gastroesophageal reflux disease with esophagitis    Abdominal pain, epigastric    Tobacco use disorder    Condyloma acuminata    Recurrent major depressive disorder, remission status unspecified     .  Current Outpatient Medications   Medication Sig Dispense Refill    sertraline (ZOLOFT) 50 MG tablet Take 1 tablet (50 mg) by mouth daily 1 tablet daily (Patient not taking: Reported on 1/28/2025) 30 tablet 6     No current facility-administered medications for this visit.        Allergies   Allergen Reactions    No Known Drug Allergy      Review of Systems   Constitutional:  Negative for chills, fatigue and fever.   Cardiovascular: Negative.  Negative for chest pain, palpitations and leg swelling.   Musculoskeletal:  Positive for arthralgias, joint swelling and myalgias. Negative for back pain, gait problem, neck pain and neck stiffness.   Skin:  Positive for rash. Negative for color change, pallor and wound.   Neurological:  Negative for numbness.   All other systems reviewed and are negative.          Objective    BP (!) 165/96 (BP Location: Left arm, Patient Position: Sitting, Cuff  Size: Adult Large)   Pulse 86   Temp 98.2  F (36.8  C) (Oral)   Resp 18   Wt 86.6 kg (191 lb)   SpO2 96%   BMI 31.98 kg/m    Body mass index is 31.98 kg/m .  Physical Exam  Vitals and nursing note reviewed.   Constitutional:       General: He is not in acute distress.     Appearance: Normal appearance. He is well-developed and normal weight. He is not ill-appearing.   Musculoskeletal:      Right wrist: Normal. No swelling, tenderness or crepitus. Normal range of motion. Normal pulse.      Left wrist: Normal. No swelling, tenderness, bony tenderness, snuff box tenderness or crepitus. Normal range of motion. Normal pulse.      Right hand: Swelling and tenderness present. No deformity, lacerations or bony tenderness. Normal range of motion. Normal strength. Normal sensation. There is no disruption of two-point discrimination. Normal capillary refill. Normal pulse.      Left hand: Laceration (1.5cm laceration present over the dorsal index finger.  no erythema or drainage present) present. No swelling, deformity or tenderness. Normal range of motion. Normal strength. Normal sensation. There is no disruption of two-point discrimination. Normal capillary refill. Normal pulse.   Skin:     General: Skin is warm.      Capillary Refill: Capillary refill takes less than 2 seconds.   Neurological:      Mental Status: He is alert and oriented to person, place, and time.      Sensory: Sensation is intact. No sensory deficit.      Motor: Motor function is intact.      Gait: Gait is intact.      Deep Tendon Reflexes: Reflexes are normal and symmetric.   Psychiatric:         Behavior: Behavior normal.         Thought Content: Thought content normal.         Judgment: Judgment normal.     Procedure:  Discussed risks and benefits of procedure and patient had decided to proceed. Laceration was irrigated with normal saline and then sterilized with betadine swabs X3.  Laceration was repaired with dermabond and dressed with  bacitracin, gauze and coban.  Minimal bleeding.  Patient tolerated procedure well.            Assessment/Plan:  Laceration of left index finger without foreign body without damage to nail, initial encounter:  No evidence of infection at this time.  Tdap was administered in clinic today as well.  This was closed with dermabond and placed in dressings.  Keep clean and dry for the next 24-48hours.  Tylenol/ibuprofen as needed for pain.  RTC if he develops worsening pain, numbness, redness, drainage or fevers.   -     TDAP 7+ (ADACEL,BOOSTRIX)  -     REPAIR SUPERFICIAL, WOUND BODY < =2.5CM        Jennifer See BOWEN Fajardo

## 2025-01-28 NOTE — TELEPHONE ENCOUNTER
Pt calling regarding cut and wanting to get tetanus booster.   Pt has not received any treatment for the cut.   It was bleeding a lot, but he was able to get the bleeding to stop, and it is not bleeding anymore.   States that cut is about a quarter of an inch deep maybe and more than 1/2 and inch long.    He put topical antibiotic and band aid on.   Location of cut is on pointer finger- left hand.  Triage disposition: Go To ED/UCC Now (Or To Office With PCP Approval). Does not have PCP.  Pt was notified of recommendations for UC or ED and verbalized understanding.    Reason for Disposition   Looks like a broken bone or dislocated joint (e.g., crooked or deformed)    Additional Information   Negative: Major bleeding (actively dripping or spurting) that can't be stopped   Negative: Sounds like a life-threatening emergency to the triager   Negative: Wound looks infected (e.g., spreading redness, pus)   Negative: Caused by animal bite   Negative: Amputation   Negative: High-pressure injection injury (e.g., from paint gun, usually work-related)    Protocols used: Finger Injury-A-OH    Divine Moise RN

## 2025-03-11 ENCOUNTER — OFFICE VISIT (OUTPATIENT)
Dept: URGENT CARE | Facility: URGENT CARE | Age: 35
End: 2025-03-11
Payer: COMMERCIAL

## 2025-03-11 ENCOUNTER — ANCILLARY PROCEDURE (OUTPATIENT)
Dept: GENERAL RADIOLOGY | Facility: CLINIC | Age: 35
End: 2025-03-11
Attending: NURSE PRACTITIONER
Payer: COMMERCIAL

## 2025-03-11 VITALS
RESPIRATION RATE: 18 BRPM | WEIGHT: 189 LBS | SYSTOLIC BLOOD PRESSURE: 138 MMHG | HEART RATE: 71 BPM | TEMPERATURE: 98 F | BODY MASS INDEX: 31.64 KG/M2 | DIASTOLIC BLOOD PRESSURE: 89 MMHG | OXYGEN SATURATION: 96 %

## 2025-03-11 DIAGNOSIS — L03.115 CELLULITIS OF RIGHT LOWER EXTREMITY: ICD-10-CM

## 2025-03-11 DIAGNOSIS — M79.671 RIGHT FOOT PAIN: ICD-10-CM

## 2025-03-11 DIAGNOSIS — Z87.39 HISTORY OF GOUT: ICD-10-CM

## 2025-03-11 DIAGNOSIS — M10.9 ACUTE GOUT OF RIGHT FOOT, UNSPECIFIED CAUSE: Primary | ICD-10-CM

## 2025-03-11 LAB
BASOPHILS # BLD AUTO: 0.1 10E3/UL (ref 0–0.2)
BASOPHILS NFR BLD AUTO: 1 %
CRP SERPL-MCNC: 10.6 MG/L
EOSINOPHIL # BLD AUTO: 0.2 10E3/UL (ref 0–0.7)
EOSINOPHIL NFR BLD AUTO: 2 %
ERYTHROCYTE [DISTWIDTH] IN BLOOD BY AUTOMATED COUNT: 11.5 % (ref 10–15)
HCT VFR BLD AUTO: 47.9 % (ref 40–53)
HGB BLD-MCNC: 17.1 G/DL (ref 13.3–17.7)
IMM GRANULOCYTES # BLD: 0 10E3/UL
IMM GRANULOCYTES NFR BLD: 0 %
LYMPHOCYTES # BLD AUTO: 2 10E3/UL (ref 0.8–5.3)
LYMPHOCYTES NFR BLD AUTO: 21 %
MCH RBC QN AUTO: 33 PG (ref 26.5–33)
MCHC RBC AUTO-ENTMCNC: 35.7 G/DL (ref 31.5–36.5)
MCV RBC AUTO: 93 FL (ref 78–100)
MONOCYTES # BLD AUTO: 0.7 10E3/UL (ref 0–1.3)
MONOCYTES NFR BLD AUTO: 7 %
NEUTROPHILS # BLD AUTO: 6.8 10E3/UL (ref 1.6–8.3)
NEUTROPHILS NFR BLD AUTO: 70 %
PLATELET # BLD AUTO: 263 10E3/UL (ref 150–450)
RBC # BLD AUTO: 5.18 10E6/UL (ref 4.4–5.9)
URATE SERPL-MCNC: 8.6 MG/DL (ref 3.4–7)
WBC # BLD AUTO: 9.7 10E3/UL (ref 4–11)

## 2025-03-11 PROCEDURE — 84550 ASSAY OF BLOOD/URIC ACID: CPT | Performed by: NURSE PRACTITIONER

## 2025-03-11 PROCEDURE — 73630 X-RAY EXAM OF FOOT: CPT | Mod: TC | Performed by: STUDENT IN AN ORGANIZED HEALTH CARE EDUCATION/TRAINING PROGRAM

## 2025-03-11 PROCEDURE — 85025 COMPLETE CBC W/AUTO DIFF WBC: CPT | Performed by: NURSE PRACTITIONER

## 2025-03-11 PROCEDURE — 3075F SYST BP GE 130 - 139MM HG: CPT | Performed by: NURSE PRACTITIONER

## 2025-03-11 PROCEDURE — 86140 C-REACTIVE PROTEIN: CPT | Performed by: NURSE PRACTITIONER

## 2025-03-11 PROCEDURE — 36415 COLL VENOUS BLD VENIPUNCTURE: CPT | Performed by: NURSE PRACTITIONER

## 2025-03-11 PROCEDURE — 99214 OFFICE O/P EST MOD 30 MIN: CPT | Performed by: NURSE PRACTITIONER

## 2025-03-11 PROCEDURE — 3079F DIAST BP 80-89 MM HG: CPT | Performed by: NURSE PRACTITIONER

## 2025-03-11 RX ORDER — PREDNISONE 20 MG/1
TABLET ORAL
Qty: 20 TABLET | Refills: 0 | Status: SHIPPED | OUTPATIENT
Start: 2025-03-11

## 2025-03-11 RX ORDER — DOXYCYCLINE 100 MG/1
100 CAPSULE ORAL 2 TIMES DAILY
Qty: 14 CAPSULE | Refills: 0 | Status: SHIPPED | OUTPATIENT
Start: 2025-03-11 | End: 2025-03-18

## 2025-03-11 NOTE — PROGRESS NOTES
"Assessment & Plan     Acute gout of right foot, unspecified cause    - predniSONE (DELTASONE) 20 MG tablet  Dispense: 20 tablet; Refill: 0    Cellulitis of right lower extremity    - doxycycline hyclate (VIBRAMYCIN) 100 MG capsule  Dispense: 14 capsule; Refill: 0    Right foot pain    - XR Foot Right G/E 3 Views  - CBC with platelets and differential  - CRP, inflammation  - Uric acid  - CBC with platelets and differential  - CRP, inflammation  - Uric acid    History of gout       Reviewed xray images and results during visit showing, \"IMPRESSION: Normal joint spaces and alignment. No fracture. No juxta-articular osseous erosion, tophus formation, or soft tissue calcification to suggest evidence of gout.\" Reviewed CBC showing no systemic infection.     Presentation not typical gout with affecting entire foot but does have gout characteristics with tenderness even with light palpation and sheets. Will cover for gout and cellulitis with doxycycline twice daily for 7 days and prednisone taper, potential side effects discussed and discussed gout pain harder to control as days go on. No NSAIDs while taking Prednisone  Tylenol 1000 mg every 8 hours as needed  Weight bearing as tolerated, he declined crutches  Rest, ice, elevate  Avoid sun with doxycyline  CRP and uric acid in process to further evaluate for inflammation and gout. Make appt with PCP to establish care.     Watch closely for worsening symptoms of infection including fever, chills, redness, swelling, pain, purulent discharge and follow-up right away if develops.     Follow-up with PCP if symptoms persist for 7 days, and sooner if symptoms worsen or new symptoms develop.     Discussed red flag symptoms which warrant immediate visit in emergency room    All questions were answered and patient verbalized understanding. AVS reviewed with patient.     Babs Mclaughlin, PAWEL, APRN, CNP 3/11/2025 11:16 AM  [facility] URGENT [facility]    Subjective "     Matt is a 34 year old male who presents to clinic today with his wife for the following health issues:  Chief Complaint   Patient presents with    Urgent Care    Swelling     Per patient states he has been having right foot swelling for a couple of days believes it's a flair up of gout.          3/11/2025    10:15 AM   Additional Questions   Roomed by MARITZA Wilson   Accompanied by Partner         3/11/2025   Forms   Any forms needing to be completed Yes       MS Injury/Pain    Onset of symptoms was 5 days ago  Location: right foot - started at ball of foot on bottom and has spread to right foot  Context: No known injury  Course of symptoms is worsening.    Severity severe  Current and Associated symptoms: Pain, Swelling, Warmth, Redness, and Decreased range of motion  Denies  Bruising, fever, drainage  Aggravating Factors: walking, weight-bearing, and movement  Therapies to improve symptoms include: ibuprofen 400 mg twice daily helps temporarily  This is not the first time this type of problem has occurred for this patient, has a history of gout.   Even light touch and sheets hurt his foot  Has been eating more healthy without alcohol  He has a history of gout, uric acid 9/26/24 was 9.2 and he was treated with prednisone    Problem list, Medication list, Allergies, and Medical history reviewed in EPIC.    ROS:  Review of systems negative except for noted above        Objective    /89   Pulse 71   Temp 98  F (36.7  C) (Oral)   Resp 18   Wt 85.7 kg (189 lb)   SpO2 96%   BMI 31.64 kg/m    Physical Exam  Constitutional:       General: He is not in acute distress.     Appearance: He is not toxic-appearing or diaphoretic.   Cardiovascular:      Pulses: Normal pulses.   Musculoskeletal:      Comments: Mild swelling right foot   Skin:     General: Skin is warm and dry.      Capillary Refill: Capillary refill takes less than 2 seconds.      Findings: Erythema present. No bruising.      Comments: Diffuse erythema  throughout right foot with increased warmth, no abscess   Neurological:      Mental Status: He is alert.      Sensory: No sensory deficit.          X-ray right foot was performed and reviewed independently by myself showing no obvious fracture   Labs and Radiologist impression:   Results for orders placed or performed in visit on 03/11/25   XR Foot Right G/E 3 Views     Status: None    Narrative    EXAM: XR FOOT RIGHT G/E 3 VIEWS  LOCATION: [facility]  DATE: 3/11/2025    INDICATION: foot pain, hx gout  COMPARISON: None.      Impression    IMPRESSION: Normal joint spaces and alignment. No fracture. No juxta-articular osseous erosion, tophus formation, or soft tissue calcification to suggest evidence of gout.   Results for orders placed or performed in visit on 03/11/25   CBC with platelets and differential     Status: None   Result Value Ref Range    WBC Count 9.7 4.0 - 11.0 10e3/uL    RBC Count 5.18 4.40 - 5.90 10e6/uL    Hemoglobin 17.1 13.3 - 17.7 g/dL    Hematocrit 47.9 40.0 - 53.0 %    MCV 93 78 - 100 fL    MCH 33.0 26.5 - 33.0 pg    MCHC 35.7 31.5 - 36.5 g/dL    RDW 11.5 10.0 - 15.0 %    Platelet Count 263 150 - 450 10e3/uL    % Neutrophils 70 %    % Lymphocytes 21 %    % Monocytes 7 %    % Eosinophils 2 %    % Basophils 1 %    % Immature Granulocytes 0 %    Absolute Neutrophils 6.8 1.6 - 8.3 10e3/uL    Absolute Lymphocytes 2.0 0.8 - 5.3 10e3/uL    Absolute Monocytes 0.7 0.0 - 1.3 10e3/uL    Absolute Eosinophils 0.2 0.0 - 0.7 10e3/uL    Absolute Basophils 0.1 0.0 - 0.2 10e3/uL    Absolute Immature Granulocytes 0.0 <=0.4 10e3/uL   CBC with platelets and differential     Status: None    Narrative    The following orders were created for panel order CBC with platelets and differential.  Procedure                               Abnormality         Status                     ---------                               -----------         ------                     CBC with platelets and  ...[[id number]]                      Final result                 Please view results for these tests on the individual orders.

## 2025-03-11 NOTE — PATIENT INSTRUCTIONS
No NSAIDs while taking Prednisone  Tylenol 1000 mg every 8 hours as needed    Watch closely for worsening symptoms of infection including fever, chills, redness, swelling, pain, purulent discharge and follow-up right away if develops.

## 2025-03-11 NOTE — LETTER
March 11, 2025      Matt Quintanilla  [address] [address]        To Whom It May Concern:    Matt Quintanilla  was seen on 3/11/25.  Please allow him to weight bear as tolerated and elevate right foot as needed for the next week unless symptoms resolve.         Sincerely,        MADINA Brice    Electronically signed

## 2025-05-20 ENCOUNTER — OFFICE VISIT (OUTPATIENT)
Dept: FAMILY MEDICINE | Facility: CLINIC | Age: 35
End: 2025-05-20
Payer: COMMERCIAL

## 2025-05-20 VITALS
WEIGHT: 174.2 LBS | OXYGEN SATURATION: 99 % | BODY MASS INDEX: 29.02 KG/M2 | HEART RATE: 75 BPM | TEMPERATURE: 97.8 F | SYSTOLIC BLOOD PRESSURE: 123 MMHG | DIASTOLIC BLOOD PRESSURE: 79 MMHG | HEIGHT: 65 IN

## 2025-05-20 DIAGNOSIS — R35.0 URINARY FREQUENCY: Primary | ICD-10-CM

## 2025-05-20 LAB
ALBUMIN UR-MCNC: NEGATIVE MG/DL
APPEARANCE UR: CLEAR
BILIRUB UR QL STRIP: NEGATIVE
COLOR UR AUTO: YELLOW
ERYTHROCYTE [DISTWIDTH] IN BLOOD BY AUTOMATED COUNT: 11.8 % (ref 10–15)
EST. AVERAGE GLUCOSE BLD GHB EST-MCNC: 85 MG/DL
GLUCOSE UR STRIP-MCNC: NEGATIVE MG/DL
HBA1C MFR BLD: 4.6 % (ref 0–5.6)
HCT VFR BLD AUTO: 46.1 % (ref 40–53)
HGB BLD-MCNC: 16.4 G/DL (ref 13.3–17.7)
HGB UR QL STRIP: NEGATIVE
KETONES UR STRIP-MCNC: NEGATIVE MG/DL
LEUKOCYTE ESTERASE UR QL STRIP: NEGATIVE
MCH RBC QN AUTO: 32.3 PG (ref 26.5–33)
MCHC RBC AUTO-ENTMCNC: 35.6 G/DL (ref 31.5–36.5)
MCV RBC AUTO: 91 FL (ref 78–100)
NITRATE UR QL: NEGATIVE
PH UR STRIP: 6 [PH] (ref 5–7)
PLATELET # BLD AUTO: 288 10E3/UL (ref 150–450)
RBC # BLD AUTO: 5.07 10E6/UL (ref 4.4–5.9)
SP GR UR STRIP: <=1.005 (ref 1–1.03)
UROBILINOGEN UR STRIP-ACNC: 0.2 E.U./DL
WBC # BLD AUTO: 8.7 10E3/UL (ref 4–11)

## 2025-05-20 PROCEDURE — 87798 DETECT AGENT NOS DNA AMP: CPT | Mod: 90

## 2025-05-20 PROCEDURE — 3078F DIAST BP <80 MM HG: CPT

## 2025-05-20 PROCEDURE — 81003 URINALYSIS AUTO W/O SCOPE: CPT

## 2025-05-20 PROCEDURE — 83036 HEMOGLOBIN GLYCOSYLATED A1C: CPT

## 2025-05-20 PROCEDURE — G0103 PSA SCREENING: HCPCS

## 2025-05-20 PROCEDURE — 99000 SPECIMEN HANDLING OFFICE-LAB: CPT

## 2025-05-20 PROCEDURE — 85027 COMPLETE CBC AUTOMATED: CPT

## 2025-05-20 PROCEDURE — 36415 COLL VENOUS BLD VENIPUNCTURE: CPT

## 2025-05-20 PROCEDURE — 80048 BASIC METABOLIC PNL TOTAL CA: CPT

## 2025-05-20 PROCEDURE — 3044F HG A1C LEVEL LT 7.0%: CPT

## 2025-05-20 PROCEDURE — 99213 OFFICE O/P EST LOW 20 MIN: CPT

## 2025-05-20 PROCEDURE — 87563 M. GENITALIUM AMP PROBE: CPT | Mod: 90

## 2025-05-20 PROCEDURE — 3074F SYST BP LT 130 MM HG: CPT

## 2025-05-20 ASSESSMENT — COLUMBIA-SUICIDE SEVERITY RATING SCALE - C-SSRS
2. IN THE PAST MONTH, HAVE YOU ACTUALLY HAD ANY THOUGHTS OF KILLING YOURSELF?: NO
6. HAVE YOU EVER DONE ANYTHING, STARTED TO DO ANYTHING, OR PREPARED TO DO ANYTHING TO END YOUR LIFE?: NO
1. WITHIN THE PAST MONTH, HAVE YOU WISHED YOU WERE DEAD OR WISHED YOU COULD GO TO SLEEP AND NOT WAKE UP?: NO

## 2025-05-20 ASSESSMENT — PATIENT HEALTH QUESTIONNAIRE - PHQ9
10. IF YOU CHECKED OFF ANY PROBLEMS, HOW DIFFICULT HAVE THESE PROBLEMS MADE IT FOR YOU TO DO YOUR WORK, TAKE CARE OF THINGS AT HOME, OR GET ALONG WITH OTHER PEOPLE: SOMEWHAT DIFFICULT
SUM OF ALL RESPONSES TO PHQ QUESTIONS 1-9: 8
SUM OF ALL RESPONSES TO PHQ QUESTIONS 1-9: 8

## 2025-05-20 NOTE — PROGRESS NOTES
"  Assessment & Plan     Urinary frequency  Patient with 2 days of increased urinary frequency and sensation of incomplete emptying. Discussed checking basic labs for further evaluation. Patient declines chlamydia and gonorrhea screening. Recommend he continue good fluid intake and closely monitor symptoms.  - UA Macroscopic with reflex to Microscopic and Culture - Lab Collect  - Basic metabolic panel  (Ca, Cl, CO2, Creat, Gluc, K, Na, BUN)  - PSA, screen  - Hemoglobin A1c  - CBC with platelets  - Urogenital Ureaplasma and Mycoplasma Species by PCR  - UA Macroscopic with reflex to Microscopic and Culture - Lab Collect  - Basic metabolic panel  (Ca, Cl, CO2, Creat, Gluc, K, Na, BUN)  - PSA, screen  - Hemoglobin A1c  - CBC with platelets  - Urogenital Ureaplasma and Mycoplasma Species by PCR      Nicotine/Tobacco Cessation  He reports that he has been smoking vaping device. He has never used smokeless tobacco.  Nicotine/Tobacco Cessation Plan  Information offered: Patient not interested at this time      BMI  Estimated body mass index is 28.99 kg/m  as calculated from the following:    Height as of this encounter: 1.651 m (5' 5\").    Weight as of this encounter: 79 kg (174 lb 3.2 oz).   Weight management plan: Discussed healthy diet and exercise guidelines    Depression Screening Follow Up        5/20/2025     1:15 PM   PHQ   PHQ-9 Total Score 8    Q9: Thoughts of better off dead/self-harm past 2 weeks Several days   F/U: Thoughts of suicide or self-harm No   F/U: Safety concerns No       Patient-reported         5/20/2025     1:15 PM   Last PHQ-9   1.  Little interest or pleasure in doing things 1   2.  Feeling down, depressed, or hopeless 1   3.  Trouble falling or staying asleep, or sleeping too much 1   4.  Feeling tired or having little energy 1   5.  Poor appetite or overeating 0   6.  Feeling bad about yourself 2   7.  Trouble concentrating 1   8.  Moving slowly or restless 0   Q9: Thoughts of better off " dead/self-harm past 2 weeks 1   PHQ-9 Total Score 8    In the past two weeks have you had thoughts of suicide or self harm? No   Do you have concerns about your personal safety or the safety of others? No       Patient-reported           5/20/2025     2:31 PM   C-SSRS (Brief Media)   Within the last month, have you wished you were dead or wished you could go to sleep and not wake up? No   Within the last month, have you had any actual thoughts of killing yourself? No   Within the last month, have you ever done anything, started to do anything, or prepared to do anything to end your life? No     Follow Up Actions Taken  Crisis resource information provided in the After Visit Summary    Discussed the following ways the patient can remain in a safe environment:  remove alcohol, remove drugs, and be around others    Follow-up    Follow-up Visit   Expected date:  Aug 20, 2025 (Approximate)      Follow Up Appointment Details:     Follow-up with whom?: My Department    Follow-Up for what?: Adult Preventive    How?: In Person                 Pantera Gutierrez is a 34 year old, presenting for the following health issues:  UTI        5/20/2025     2:10 PM   Additional Questions   Roomed by Verónica   Accompanied by Self         5/20/2025     2:10 PM   Patient Reported Additional Medications   Patient reports taking the following new medications no     UTI    History of Present Illness       Reason for visit:  Frequent urination  Symptom onset:  1-3 days ago  Symptom intensity:  Moderate  Symptom progression:  Staying the same  Had these symptoms before:  No   He is taking medications regularly.        Patient presents with symptoms of incomplete emptying and increased urinary frequency. Symptoms started yesterday. Overnight patient had to get up multiple times. Reports that he constantly feels like he needs to urinate. Today he started to have some bilateral flank pain. Denies any hematuria, odor, dysuria, penile discharge,  "fevers, chills, or other symptom. Denies any new sexual partners. Last intercourse was Saturday.      Objective    /79 (BP Location: Left arm, Patient Position: Sitting, Cuff Size: Adult Large)   Pulse 75   Temp 97.8  F (36.6  C) (Temporal)   Ht 1.651 m (5' 5\")   Wt 79 kg (174 lb 3.2 oz)   SpO2 99%   BMI 28.99 kg/m    Body mass index is 28.99 kg/m .  Physical Exam   GENERAL: alert and no distress  EYES: Eyes grossly normal to inspection, PERRL and conjunctivae and sclerae normal  HENT: ear canals and TM's normal, nose and mouth without ulcers or lesions  NECK: no adenopathy, no asymmetry, masses, or scars  RESP: lungs clear to auscultation - no rales, rhonchi or wheezes  CV: regular rate and rhythm, normal S1 S2,  no murmur, click or rub, no peripheral edema  ABDOMEN: soft, nontender, no hepatosplenomegaly, no masses and bowel sounds normal. Negative CVA tenderness bilaterally.   (male): Exam denied by patient  MS: no gross musculoskeletal defects noted, no edema  SKIN: no suspicious lesions or rashes  NEURO: Normal strength and tone, mentation intact and speech normal  PSYCH: mentation appears normal, affect normal/bright        Signed Electronically by: María Elena Escobar    "

## 2025-05-20 NOTE — PATIENT INSTRUCTIONS
At [facility], we strive to deliver an exceptional experience to you, every time we see you. If you receive a survey, please let us know what we are doing well and/or what we could improve upon, as we do value your feedback.  If you have MyChart, you can expect to receive results automatically within 24 hours of their completion.  Your provider will send a note interpreting your results as well.   If you do not have MyChart, you should receive your results in about a week by mail.    Your care team:                            Family Medicine Internal Medicine   MD Rigo Stanton, MD Margarita Ervin, MD Ulices Fuller, MD Anastasiia Velez, PA-C    Rubén Almanzar, MD Pediatrics   Maral Hoffman, MD Keke Christian, DARA Spann CNP eDsiree Mejia, MD Dianne Kamara, MD Zoila King, CNP     María Elena Escobar, PA-C Same-Day Provider (No follow-up visits)   DARA Meza, PAWEL Myles, PA-C    Karen Richmond PA-C     Clinic hours: Monday - Thursday 7 am-6 pm; Fridays 7 am-5 pm.   Urgent care: Monday - Friday 10 am- 8 pm; Saturday and Sunday 9 am-5 pm.    Clinic: [phone]       [facility]: Monday - Thursday 8 am - 7 pm; Friday 8 am - 6 pm  [facility]: [phone]

## 2025-05-21 ENCOUNTER — RESULTS FOLLOW-UP (OUTPATIENT)
Dept: FAMILY MEDICINE | Facility: CLINIC | Age: 35
End: 2025-05-21

## 2025-05-21 LAB
ANION GAP SERPL CALCULATED.3IONS-SCNC: 15 MMOL/L (ref 7–15)
BUN SERPL-MCNC: 5.6 MG/DL (ref 6–20)
CALCIUM SERPL-MCNC: 9.7 MG/DL (ref 8.8–10.4)
CHLORIDE SERPL-SCNC: 102 MMOL/L (ref 98–107)
CREAT SERPL-MCNC: 0.66 MG/DL (ref 0.67–1.17)
EGFRCR SERPLBLD CKD-EPI 2021: >90 ML/MIN/1.73M2
GLUCOSE SERPL-MCNC: 83 MG/DL (ref 70–99)
HCO3 SERPL-SCNC: 24 MMOL/L (ref 22–29)
POTASSIUM SERPL-SCNC: 3.7 MMOL/L (ref 3.4–5.3)
PSA SERPL DL<=0.01 NG/ML-MCNC: 0.35 NG/ML
SODIUM SERPL-SCNC: 141 MMOL/L (ref 135–145)

## 2025-07-13 ENCOUNTER — HEALTH MAINTENANCE LETTER (OUTPATIENT)
Age: 35
End: 2025-07-13